# Patient Record
Sex: MALE | Race: WHITE
[De-identification: names, ages, dates, MRNs, and addresses within clinical notes are randomized per-mention and may not be internally consistent; named-entity substitution may affect disease eponyms.]

---

## 2019-01-29 ENCOUNTER — HOSPITAL ENCOUNTER (OUTPATIENT)
Dept: HOSPITAL 53 - M OROP | Age: 24
End: 2019-01-29
Attending: SURGERY
Payer: COMMERCIAL

## 2019-01-29 VITALS — BODY MASS INDEX: 30.81 KG/M2 | WEIGHT: 208 LBS | HEIGHT: 69 IN

## 2019-01-29 VITALS — DIASTOLIC BLOOD PRESSURE: 65 MMHG | SYSTOLIC BLOOD PRESSURE: 127 MMHG

## 2019-01-29 DIAGNOSIS — I73.9: Primary | ICD-10-CM

## 2019-01-29 DIAGNOSIS — Z53.8: ICD-10-CM

## 2019-02-07 ENCOUNTER — HOSPITAL ENCOUNTER (INPATIENT)
Dept: HOSPITAL 53 - M SDC | Age: 24
LOS: 16 days | Discharge: HOME | DRG: 254 | End: 2019-02-23
Attending: SURGERY | Admitting: SURGERY
Payer: COMMERCIAL

## 2019-02-07 VITALS — SYSTOLIC BLOOD PRESSURE: 126 MMHG | DIASTOLIC BLOOD PRESSURE: 70 MMHG

## 2019-02-07 VITALS — DIASTOLIC BLOOD PRESSURE: 69 MMHG | SYSTOLIC BLOOD PRESSURE: 126 MMHG

## 2019-02-07 VITALS — HEIGHT: 69 IN | WEIGHT: 190.04 LBS | BODY MASS INDEX: 28.15 KG/M2

## 2019-02-07 VITALS — DIASTOLIC BLOOD PRESSURE: 68 MMHG | SYSTOLIC BLOOD PRESSURE: 124 MMHG

## 2019-02-07 VITALS — SYSTOLIC BLOOD PRESSURE: 130 MMHG | DIASTOLIC BLOOD PRESSURE: 71 MMHG

## 2019-02-07 VITALS — DIASTOLIC BLOOD PRESSURE: 70 MMHG | SYSTOLIC BLOOD PRESSURE: 122 MMHG

## 2019-02-07 VITALS — SYSTOLIC BLOOD PRESSURE: 127 MMHG | DIASTOLIC BLOOD PRESSURE: 66 MMHG

## 2019-02-07 DIAGNOSIS — I70.211: Primary | ICD-10-CM

## 2019-02-07 DIAGNOSIS — F17.200: ICD-10-CM

## 2019-02-07 DIAGNOSIS — Z79.899: ICD-10-CM

## 2019-02-07 LAB
BASOPHILS # BLD AUTO: 0.1 10^3/UL (ref 0–0.2)
BASOPHILS NFR BLD AUTO: 0.5 % (ref 0–1)
BUN SERPL-MCNC: 12 MG/DL (ref 7–18)
CALCIUM SERPL-MCNC: 8.9 MG/DL (ref 8.5–10.1)
CHLORIDE SERPL-SCNC: 105 MEQ/L (ref 98–107)
CO2 SERPL-SCNC: 24 MEQ/L (ref 21–32)
CREAT SERPL-MCNC: 1.04 MG/DL (ref 0.7–1.3)
EOSINOPHIL # BLD AUTO: 0.4 10^3/UL (ref 0–0.5)
EOSINOPHIL NFR BLD AUTO: 3.8 % (ref 0–3)
GFR SERPL CREATININE-BSD FRML MDRD: > 60 ML/MIN/{1.73_M2} (ref 60–?)
GLUCOSE SERPL-MCNC: 76 MG/DL (ref 70–100)
HCT VFR BLD AUTO: 34.8 % (ref 42–52)
HGB BLD-MCNC: 11.9 G/DL (ref 13.5–17.5)
LYMPHOCYTES # BLD AUTO: 2.1 10^3/UL (ref 1.5–6.5)
LYMPHOCYTES NFR BLD AUTO: 22.5 % (ref 24–44)
MCH RBC QN AUTO: 31.1 PG (ref 27–33)
MCHC RBC AUTO-ENTMCNC: 34.2 G/DL (ref 32–36.5)
MCV RBC AUTO: 90.9 FL (ref 80–96)
MONOCYTES # BLD AUTO: 0.9 10^3/UL (ref 0–0.8)
MONOCYTES NFR BLD AUTO: 9 % (ref 0–5)
NEUTROPHILS # BLD AUTO: 6 10^3/UL (ref 1.8–7.7)
NEUTROPHILS NFR BLD AUTO: 63.8 % (ref 36–66)
PLATELET # BLD AUTO: 490 10^3/UL (ref 150–450)
POTASSIUM SERPL-SCNC: 4.3 MEQ/L (ref 3.5–5.1)
RBC # BLD AUTO: 3.83 10^6/UL (ref 4.3–6.1)
SODIUM SERPL-SCNC: 138 MEQ/L (ref 136–145)
WBC # BLD AUTO: 9.5 10^3/UL (ref 4–10)

## 2019-02-07 PROCEDURE — B41F1ZZ FLUOROSCOPY OF RIGHT LOWER EXTREMITY ARTERIES USING LOW OSMOLAR CONTRAST: ICD-10-PCS | Performed by: SURGERY

## 2019-02-07 RX ADMIN — DOCUSATE SODIUM,SENNOSIDES SCH TAB: 50; 8.6 TABLET, FILM COATED ORAL at 21:11

## 2019-02-07 RX ADMIN — DOCUSATE SODIUM SCH MG: 100 CAPSULE, LIQUID FILLED ORAL at 21:11

## 2019-02-07 NOTE — HPEPDOC
General


Date of Admission


02/07/2019


Attending Physician:  Marcus Howard MD


Chief Complaint


The patient is a 23-year-old male with right lower extremity claudication and 

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


Source:  Patient


Exam Limitations:  No limitations


Timing/Duration:  Constant, Getting worse


Severity:  Moderate





History of Present Illness


Patient is a 23-year-old male who injured his right leg approximately a year and

a half ago and underwent evaluation at [facility] for that was 

demonstrated that he had superficial femoral arterial occlusion on an MRA.  

Recommendation was for the patient at that time to continue with conservative 

management as his only symptoms were claudication in his right lower extremity. 

Patient has had worsening of his symptoms with claudication and less distance 

and is unable to perform his daily activities in the .  Patient 

underwent angiography with an attempt to recanalize through the occluded SFA and

popliteal artery without success.  Patient then underwent exposure of the above 

and below knee popliteal artery with attempted recanalization and endovascular 

intervention without success.  A third attempt at revascularization endovascular

was performed with posterior tibial artery cannulation and attempted retrograde 

recanalization through the popliteal and superficial femoral arteries which was 

unsuccessful.  The patient will undergo a right femoral to anterior tibial 

artery bypass with saphenous vein graft.





Home Medications


Scheduled


Cilostazol (Cilostazol) 100 Mg Tab, 100 MG PO BID@0730,1730


Clopidogrel Bisulfate (Clopidogrel) 75 Mg Tab, 75 MG PO DAILY


Ferrous Gluconate (Ferrous Gluconate) 324 Mg Tab, 1 TAB PO DAILY for iron


Gabapentin (Gabapentin) 100 Mg Cap, 100 MG PO TID





Scheduled PRN


Oxycodone/Acetaminophen (Percocet 5MG/325MG Tablet) 1 Tab Tab, 2 TAB PO Q6HP PRN

for SEVERE PAIN (PS 8-10)





Allergies


Coded Allergies:  


     No Known Allergies (Unverified , 1/29/19)





Subjective


General


Date/Time Seen


The patient was seen on 2/7/19 at 16:09.





Subject


Chief Complaint/History


The patient is a 23-year-old male admitted with right lower extremity 

claudication and complete occlusion of his right superficial femoral and 

popliteal arteries.





Current Medications


Current Medications





Current Medications


Acetaminophen/ Hydrocodone Bitart (Norco, Anexsia 5/325) 1 tab Q4HP  PRN PO MOD

ERATE PAIN (PS 5-7);  Start 2/7/19 at 18:45


Bisacodyl (Dulcolax Suppository) 10 mg DAILYPRN  PRN [place] CONSTIPATION;  Start 

2/7/19 at 18:45


Docusate Sodium (Colace) 100 mg BID PO  Last administered on 2/7/19at 21:11;  

Start 2/7/19 at 21:00


Heparin Sodium (Porcine) (Heparin)  ASDIRECTED  PRN IV SEE LABEL COMMENTS;  

Start 2/7/19 at 19:00


Heparin Sodium (Porcine) 19693 units/IV Miscellaneous Supplies 250 ml @ 0 mls/hr

Q0M IV  Last administered on 2/7/19at 21:07;  Start 2/7/19 at 18:51


Magnesium Hydroxide (Milk Of Magnesia) 30 ml DAILYPRN  PRN PO CONSTIPATION;  

Start 2/7/19 at 18:45


Non-Formulary Medication (Heparin Iv Rate Change Documentation ml/ Hr)  

ASDIRECTED XX ;  Start 2/7/19 at 19:00;  Stop 2/12/19 at 18:59


Senna/Docusate Sodium (Senokot S) 1 tab BID PO  Last administered on 2/7/19at 

21:11;  Start 2/7/19 at 21:00





Past Medical History


Medical History


Right lower extremity claudication with complete occlusion of his right 

superficial femoral and popliteal arteries.


Surgical History


Right lower extremity angiogram.


Right above and below knee popliteal artery exposure with attempted 

endarterectomy and revascularization.


Right lower extremity angiogram with ultrasound guided right posterior tibial 

artery cannulation with attempted recanalization of the occluded right 

superficial femoral and popliteal arteries which was unsuccessful.





Family History


Significant Family History:  No pertinent family hx





Social History


* Smoker:  current smoker, cigarettes


Alcohol:  Denies


Drugs:  denies


Recent Travel/Sick Contacts:  Denies: Recent travel, Recent sick contacts


Psychosocial History:  No pertinent psych hx





Review of Systems


Review of Systems


General:  Denies: Chills, Night Sweats, Fatigue, Malaise, Normal Appetite


Constitutional:  Denies: Chills, Fever, Malaise, Night Sweats, Weakness, 

Fatigue, Weight Loss, Lethargy


Eyes:  Denies: Pain, Vision change, Conjunctivae inflammation, Eyelid 

inflammation, Redness


HEENT:  Denies: Head Aches, Ear Pain, Dysphagia, Sinus Congestion, Post Nasal 

Drip, Sore Throat, Epistaxis


Skin:  Denies: Rash, Lesions, Jaundice, Bruising, Itching, Dry, Breakdown, Nail 

Changes


Pulmonary:  Denies: Dyspnea, Cough, Pleuritic Chest Pain


Cardiovascular:  Denies: Chest Pain, Palpitations, Orthopnea, Paroxysmal Noc. 

Dyspnea, Edema, Lt Headedness


Gastrointestinal:  Denies: Nausea, Vomiting, Abdominal Pain, Diarrhea, 

Constipation, Melena, Hematochezia


Genitourinary:  Denies: Dysuria, Frequency, Incontinence, Hematuria, Retention


Hematologic:  Denies: Bruising, Bleeding Excessively, Petecchia, Purpura, 

Enlarged Lymph Nodes


ENDOCRINE:  Denies: Polydipsia, Polyphagia, Polyuria, Heat Intolerance, Cold 

Intolerance


Musculoskeletal:  Denies: Neck Pain, Back Pain, Shoulder Pain, Arm Pain, Hand 

Pain, Leg Pain, Foot Pain, Joint Pain, Muscle Pain, Spasms


Neurological:  Denies: Weakness, Numbness, Incoordination, Change in speech, 

Confusion, Seizures


Psych:  Reports: Mood Normal; 


   Denies: Anxiety, Depression, Memory Issues, Thoughts of Self Harm, Anger, 

Thoughts of Harming Other





VITAL SIGNS


VITAL SIGNS





Vital Signs








  Date Time  Temp Pulse Resp B/P (MAP) Pulse Ox O2 Delivery O2 Flow Rate FiO2


 


2/7/19 13:51 98.1 76 18 127/60 (82) 96 Room Air  





Laboratory Tests


2/7/19 19:22: Activated Partial Thromboplast Time 136.9*H


2/7/19 19:23: 


White Blood Count 9.5, Red Blood Count 3.83L, Hemoglobin 11.9L, Hematocrit 

34.8L, Mean Corpuscular Volume 90.9, Mean Corpuscular Hemoglobin 31.1, Mean 

Corpuscular Hemoglobin Concent 34.2, Red Cell Distribution Width 11.2L, Platelet

Count 490H, Neutrophils (%) (Auto) 63.8, Lymphocytes (%) (Auto) 22.5L, Monocytes

(%) (Auto) 9.0H, Eosinophils (%) (Auto) 3.8H, Basophils (%) (Auto) 0.5, Neutrop

hils # (Auto) 6.0, Lymphocytes # (Auto) 2.1, Monocytes # (Auto) 0.9H, 

Eosinophils # (Auto) 0.4, Basophils # (Auto) 0.1, Immature Granulocyte % (Auto) 

0.4, Nucleated Red Blood Cells % (auto) 0.0, Blood Urea Nitrogen 12, Creatinine 

1.04, Sodium Level 138, Potassium Level 4.3, Chloride Level 105, Carbon Dioxide 

Level 24, Calcium Level 8.9, Anion Gap 9, Glomerular Filtration Rate > 60.0, 

Fasting Glucose 76





Current Medications








 Medications


  (Trade)  Dose


 Ordered  Sig/Srinivasa


 Route


 PRN Reason  Start Time


 Stop Time Status Last Admin


Dose Admin


 


 Docusate Sodium


  (Colace)  100 mg  BID


 PO


   2/7/19 21:00


    2/7/19 21:11


100 MG


 


 Heparin Sodium


  (Porcine) 86066


 units/IV


 Miscellaneous


 Supplies  250 ml @ 0


 mls/hr  Q0M


 IV


   2/7/19 18:51


    2/7/19 21:07


14 MLS/HR


 


 Senna/Docusate


 Sodium


  (Senokot S)  1 tab  BID


 PO


   2/7/19 21:00


    2/7/19 21:11


1 TAB





Laboratory Tests


2/7/19 19:23








Red Blood Count 3.83 L, Mean Corpuscular Volume 90.9, Mean Corpuscular 

Hemoglobin 31.1, Mean Corpuscular Hemoglobin Concent 34.2, Red Cell Distribution

Width 11.2 L, Neutrophils (%) (Auto) 63.8, Lymphocytes (%) (Auto) 22.5 L, 

Monocytes (%) (Auto) 9.0 H, Eosinophils (%) (Auto) 3.8 H, Basophils (%) (Auto) 

0.5, Neutrophils # (Auto) 6.0, Lymphocytes # (Auto) 2.1, Monocytes # (Auto) 0.9 

H, Eosinophils # (Auto) 0.4, Basophils # (Auto) 0.1, Calcium Level 8.9





Objective


Physical Examination


General Exam:  Positive: Alert, Cooperative, Mild Distress


Eye Exam:  Positive: PERRLA, Conjunctiva & lids normal, EOMI


ENT Exam:  Positive: Atraumatic, Mucous membr. moist/pink, Pharynx Normal, 

Tongue Midline, Pharyngeal Edema, Nares Patent, Ext Auditory Canal Nml, Pinna 

Normal


Neck Exam:  Positive: Supple, +2 carotid pulse wo bruit; 


   Negative: JVD, thyromegaly, Lymphadenopathy


Chest Exam:  Positive: Clear to auscultation, Normal air movement; 


   Negative: Rales, Rhonchi, Wheezing, Diminished


Heart Exam:  Positive: Rate Normal; 


   Negative: Tachycardic, Bradycardic, Regular Rhythm, Irregular Rhythm, 

Gallops, Murmurs, Rubs


Telemetry:  Positive: No significant arrhythmia, Sinus


Abdomen Exam:  Positive: Normal bowel sounds, Soft; 


   Negative: BS Hyperactive, BS Hypoactive, Tenderness, Hepatospenomegaly, Mass,

Hernia


Male  Exam:  Positive: Normal Genital Exam


Extremity Exam:  Positive: Normal pulses (patient with nonpalpable popliteal 

dorsalis pedis and posterior tibial pulses in the right lower extremity.  The 

pulses in the right lower extremity are obtainable with continuous wave Doppler 

ultrasound and are monophasic.); 


   Negative: Clubbing, Cyanosis, Edema, Tenderness, Swelling


Skin Exam:  Positive: Nl turgor and temperature; 


   Negative: Rash, Breakdown, Lesion, Pruritus


Neuro Exam:  Positive: Normal Gait, Normal Speech, Strength at 5/5 X4 ext, 

Normal Tone, Sensation Intact, Cranial Nerves 3-12 NL, Reflexes 2+


Psych Exam:  Positive: Mental status NL, Mood NL, Memory Intact, Oriented x 3; 


   Negative: Anxiety





Assessment/Plan


Assessment


23-year-old female with complete occlusion of his right side superficial femoral

and popliteal artery with severe debilitating claudication in his right lower 

extremity.  Patient has undergone multiple endovascular and hybrid combined open

and endovascular attempts at revascularization without success.  Patient will 

now undergo a right femoral to anterior tibial artery bypass graft with 

saphenous vein graft.





Plan


Right femoral to anterior tibial artery bypass grafting with saphenous vein 

graft.











Marcus Howard MD               Feb 7, 2019 22:10

## 2019-02-08 VITALS — SYSTOLIC BLOOD PRESSURE: 125 MMHG | DIASTOLIC BLOOD PRESSURE: 63 MMHG

## 2019-02-08 VITALS — DIASTOLIC BLOOD PRESSURE: 66 MMHG | SYSTOLIC BLOOD PRESSURE: 130 MMHG

## 2019-02-08 VITALS — DIASTOLIC BLOOD PRESSURE: 60 MMHG | SYSTOLIC BLOOD PRESSURE: 128 MMHG

## 2019-02-08 LAB
HCT VFR BLD AUTO: 25.1 % (ref 42–52)
HGB BLD-MCNC: 8.9 G/DL (ref 13.5–17.5)
MCH RBC QN AUTO: 31.3 PG (ref 27–33)
MCHC RBC AUTO-ENTMCNC: 35.5 G/DL (ref 32–36.5)
MCV RBC AUTO: 88.4 FL (ref 80–96)
PLATELET # BLD AUTO: 402 10^3/UL (ref 150–450)
RBC # BLD AUTO: 2.84 10^6/UL (ref 4.3–6.1)
WBC # BLD AUTO: 11.1 10^3/UL (ref 4–10)

## 2019-02-08 PROCEDURE — 04JY0ZZ INSPECTION OF LOWER ARTERY, OPEN APPROACH: ICD-10-PCS | Performed by: SURGERY

## 2019-02-08 PROCEDURE — 06BP0ZZ EXCISION OF RIGHT SAPHENOUS VEIN, OPEN APPROACH: ICD-10-PCS | Performed by: SURGERY

## 2019-02-08 RX ADMIN — HYDROCODONE BITARTRATE AND ACETAMINOPHEN PRN TAB: 5; 325 TABLET ORAL at 01:35

## 2019-02-08 RX ADMIN — FENTANYL CITRATE PRN MCG: 50 INJECTION, SOLUTION INTRAMUSCULAR; INTRAVENOUS at 23:40

## 2019-02-08 RX ADMIN — FENTANYL CITRATE PRN MCG: 50 INJECTION, SOLUTION INTRAMUSCULAR; INTRAVENOUS at 23:35

## 2019-02-08 RX ADMIN — FENTANYL CITRATE PRN MCG: 50 INJECTION, SOLUTION INTRAMUSCULAR; INTRAVENOUS at 23:45

## 2019-02-08 RX ADMIN — FENTANYL CITRATE PRN MCG: 50 INJECTION, SOLUTION INTRAMUSCULAR; INTRAVENOUS at 23:50

## 2019-02-08 RX ADMIN — DOCUSATE SODIUM SCH MG: 100 CAPSULE, LIQUID FILLED ORAL at 12:06

## 2019-02-08 RX ADMIN — DOCUSATE SODIUM SCH MG: 100 CAPSULE, LIQUID FILLED ORAL at 21:00

## 2019-02-08 RX ADMIN — DOCUSATE SODIUM,SENNOSIDES SCH TAB: 50; 8.6 TABLET, FILM COATED ORAL at 12:06

## 2019-02-08 RX ADMIN — DOCUSATE SODIUM,SENNOSIDES SCH TAB: 50; 8.6 TABLET, FILM COATED ORAL at 21:00

## 2019-02-08 NOTE — ECGEPIP
Stationary ECG Study

                              [facility]

                                       

                                       Test Date:    2019

Pat Name:     AMANDA DALEY              Department:   

Patient ID:   [id number]                 Room:         -

Gender:       M                        Technician:   

:          [id number]               Requested By: CHINA GORE

Order Number: [id number]     Reading MD:   Kika Farr

                                 Measurements

Intervals                              Axis          

Rate:         71                       P:            67

IA:           132                      QRS:          61

QRSD:         86                       T:            42

QT:           358                                    

QTc:          389                                    

                           Interpretive Statements

SINUS RHYTHM WITH SINUS ARRHYTHMIA

NO PRIOR 

Electronically Signed On 2019 8:39:13 EST by Kika Farr

## 2019-02-09 VITALS — DIASTOLIC BLOOD PRESSURE: 65 MMHG | SYSTOLIC BLOOD PRESSURE: 143 MMHG

## 2019-02-09 VITALS — DIASTOLIC BLOOD PRESSURE: 67 MMHG | SYSTOLIC BLOOD PRESSURE: 129 MMHG

## 2019-02-09 VITALS — DIASTOLIC BLOOD PRESSURE: 70 MMHG | SYSTOLIC BLOOD PRESSURE: 140 MMHG

## 2019-02-09 VITALS — SYSTOLIC BLOOD PRESSURE: 142 MMHG | DIASTOLIC BLOOD PRESSURE: 72 MMHG

## 2019-02-09 VITALS — DIASTOLIC BLOOD PRESSURE: 74 MMHG | SYSTOLIC BLOOD PRESSURE: 139 MMHG

## 2019-02-09 VITALS — SYSTOLIC BLOOD PRESSURE: 153 MMHG | DIASTOLIC BLOOD PRESSURE: 80 MMHG

## 2019-02-09 VITALS — SYSTOLIC BLOOD PRESSURE: 140 MMHG | DIASTOLIC BLOOD PRESSURE: 69 MMHG

## 2019-02-09 VITALS — DIASTOLIC BLOOD PRESSURE: 74 MMHG | SYSTOLIC BLOOD PRESSURE: 140 MMHG

## 2019-02-09 VITALS — SYSTOLIC BLOOD PRESSURE: 141 MMHG | DIASTOLIC BLOOD PRESSURE: 63 MMHG

## 2019-02-09 LAB
APTT BLD: 31.2 SECONDS (ref 25.4–37.6)
BASOPHILS # BLD AUTO: 0 10^3/UL (ref 0–0.2)
BASOPHILS NFR BLD AUTO: 0.2 % (ref 0–1)
EOSINOPHIL # BLD AUTO: 0.1 10^3/UL (ref 0–0.5)
EOSINOPHIL NFR BLD AUTO: 0.6 % (ref 0–3)
ERYTHROCYTE [SEDIMENTATION RATE] IN BLOOD BY WESTERGREN METHOD: 46 MM/HR (ref 0–15)
HCT VFR BLD AUTO: 27.6 % (ref 42–52)
HGB BLD-MCNC: 9.7 G/DL (ref 13.5–17.5)
INR PPP: 1.18
LYMPHOCYTES # BLD AUTO: 2.1 10^3/UL (ref 1.5–6.5)
LYMPHOCYTES NFR BLD AUTO: 19.7 % (ref 24–44)
MCH RBC QN AUTO: 30.9 PG (ref 27–33)
MCHC RBC AUTO-ENTMCNC: 35.1 G/DL (ref 32–36.5)
MCV RBC AUTO: 87.9 FL (ref 80–96)
MONOCYTES # BLD AUTO: 1 10^3/UL (ref 0–0.8)
MONOCYTES NFR BLD AUTO: 9.9 % (ref 0–5)
NEUTROPHILS # BLD AUTO: 7.3 10^3/UL (ref 1.8–7.7)
NEUTROPHILS NFR BLD AUTO: 69 % (ref 36–66)
PLATELET # BLD AUTO: 226 10^3/UL (ref 150–450)
PROTHROMBIN TIME: 15.2 SECONDS (ref 12.1–14.4)
RBC # BLD AUTO: 3.14 10^6/UL (ref 4.3–6.1)
WBC # BLD AUTO: 10.5 10^3/UL (ref 4–10)

## 2019-02-09 RX ADMIN — MORPHINE SULFATE PRN MG: 4 INJECTION INTRAVENOUS at 02:04

## 2019-02-09 RX ADMIN — DOCUSATE SODIUM,SENNOSIDES SCH TAB: 50; 8.6 TABLET, FILM COATED ORAL at 07:53

## 2019-02-09 RX ADMIN — HYDROCODONE BITARTRATE AND ACETAMINOPHEN PRN TAB: 5; 325 TABLET ORAL at 09:23

## 2019-02-09 RX ADMIN — MORPHINE SULFATE PRN MG: 4 INJECTION INTRAVENOUS at 17:25

## 2019-02-09 RX ADMIN — HYDROCODONE BITARTRATE AND ACETAMINOPHEN PRN TAB: 5; 325 TABLET ORAL at 13:38

## 2019-02-09 RX ADMIN — MORPHINE SULFATE PRN MG: 4 INJECTION INTRAVENOUS at 07:49

## 2019-02-09 RX ADMIN — MORPHINE SULFATE PRN MG: 4 INJECTION INTRAVENOUS at 19:40

## 2019-02-09 RX ADMIN — DOCUSATE SODIUM,SENNOSIDES SCH TAB: 50; 8.6 TABLET, FILM COATED ORAL at 19:40

## 2019-02-09 RX ADMIN — HYDROCODONE BITARTRATE AND ACETAMINOPHEN PRN TAB: 5; 325 TABLET ORAL at 18:01

## 2019-02-09 RX ADMIN — MORPHINE SULFATE PRN MG: 4 INJECTION INTRAVENOUS at 14:03

## 2019-02-09 RX ADMIN — DOCUSATE SODIUM SCH MG: 100 CAPSULE, LIQUID FILLED ORAL at 07:52

## 2019-02-09 RX ADMIN — MORPHINE SULFATE PRN MG: 4 INJECTION INTRAVENOUS at 16:05

## 2019-02-09 RX ADMIN — MORPHINE SULFATE PRN MG: 4 INJECTION INTRAVENOUS at 01:23

## 2019-02-09 RX ADMIN — DOCUSATE SODIUM SCH MG: 100 CAPSULE, LIQUID FILLED ORAL at 19:40

## 2019-02-09 RX ADMIN — MORPHINE SULFATE PRN MG: 4 INJECTION INTRAVENOUS at 23:24

## 2019-02-09 RX ADMIN — HYDROCODONE BITARTRATE AND ACETAMINOPHEN PRN TAB: 5; 325 TABLET ORAL at 22:11

## 2019-02-09 RX ADMIN — HYDROCODONE BITARTRATE AND ACETAMINOPHEN PRN TAB: 5; 325 TABLET ORAL at 04:33

## 2019-02-09 RX ADMIN — MORPHINE SULFATE PRN MG: 4 INJECTION INTRAVENOUS at 20:32

## 2019-02-09 NOTE — IPNPDOC
Subjective


General


Date/Time Seen


The patient was seen on 2/9/19 at 22:03.





Subject


Chief Complaint/History


The patient is a 23-year-old male admitted with a reason for visit of 

Claudication. 


Patient has significant pain in his right lower extremity.





Current Medications


Current Medications





Current Medications


Acetaminophen/ Hydrocodone Bitart (Norco, Anexsia 5/325) 1 tab Q4HP  PRN PO 

MODERATE PAIN (PS 5-7) Last administered on 2/9/19at 18:01;  Start 2/7/19 at 

18:45;  Stop 2/9/19 at 20:44;  Status DC


Acetaminophen/ Hydrocodone Bitart (Norco, Anexsia 5/325) 2 tab Q4HP  PRN PO 

SEVERE PAIN (PS 8-10);  Start 2/9/19 at 20:45


Bisacodyl (Dulcolax Suppository) 10 mg DAILYPRN  PRN [place] CONSTIPATION;  Start 

2/7/19 at 18:45


Docusate Sodium (Colace) 100 mg BID PO  Last administered on 2/9/19at 19:40;  

Start 2/7/19 at 21:00


Fentanyl Citrate (Sublimaze) 25 mcg Q5MP  PRN IV MODERATE PAIN (PS 4-7) Last 

administered on 2/8/19at 23:50;  Start 2/8/19 at 23:30;  Stop 2/9/19 at 00:20;  

Status DC


Heparin Sodium (Porcine) (Heparin)  ASDIRECTED  PRN IV SEE LABEL COMMENTS;  

Start 2/7/19 at 19:00;  Stop 2/8/19 at 23:52;  Status DC


Heparin Sodium (Porcine) 84439 units/IV Miscellaneous Supplies 250 ml @ 0 mls/hr

Q0M IV  Last administered on 2/7/19at 21:07;  Start 2/7/19 at 18:51;  Stop 

2/8/19 at 23:34;  Status DC


Hydromorphone HCl (Dilaudid) 0.5 mg Q15MP  PRN IV MODERATE/SEVERE PAIN (PS 5-10)

Last administered on 2/8/19at 23:58;  Start 2/8/19 at 23:45;  Stop 2/9/19 at 

01:15;  Status DC


Lactated Ringer's 1,000 ml @  100 mls/hr Q10H IV ;  Start 2/8/19 at 23:30;  Stop

2/9/19 at 01:00;  Status DC


Magnesium Hydroxide (Milk Of Magnesia) 30 ml DAILYPRN  PRN PO CONSTIPATION;  

Start 2/7/19 at 18:45


Metoclopramide HCl (REGLAN INJection) 10 mg Q6HP  PRN IV IF N/V OERSISTS IN 

RECOVERY Last administered on 2/9/19at 00:33;  Start 2/9/19 at 00:30;  Stop 

2/9/19 at 01:30;  Status DC


Morphine Sulfate (Morphine Sulfate Inj) 2 mg Q30MP  PRN IV SEVERE PAIN (PS 8-10)

Last administered on 2/9/19at 20:32;  Start 2/9/19 at 01:15


Non-Formulary Medication (Heparin Iv Rate Change Documentation ml/ Hr)  

ASDIRECTED XX ;  Start 2/7/19 at 19:00;  Stop 2/8/19 at 23:51;  Status DC


Ondansetron HCl (ZOFRAN INJection) 4 mg Q6HP  PRN IV NAUSEA OR VOMITING Last 

administered on 2/9/19at 01:23;  Start 2/9/19 at 01:15


Oxycodone/ Acetaminophen (Percocet 5mg/ 325mg Tablet) 1 tab ASDIRECTED  PRN PO 

MILD/MODERATE PAIN (PS 1-7) Last administered on 2/9/19at 00:09;  Start 2/8/19 

at 23:30;  Stop 2/9/19 at 01:00;  Status DC


Senna/Docusate Sodium (Senokot S) 1 tab BID PO  Last administered on 2/9/19at 

19:40;  Start 2/7/19 at 21:00





Allergies


Coded Allergies:  


     No Known Allergies (Unverified , 1/29/19)





VITAL SIGNS


VITAL SIGNS





Vital Signs








  Date Time  Temp Pulse Resp B/P (MAP) Pulse Ox O2 Delivery O2 Flow Rate FiO2


 


2/9/19 20:45   18     


 


2/9/19 20:32   16     


 


2/9/19 19:40   16     


 


2/9/19 18:31   16     


 


2/9/19 18:01   18     


 


2/9/19 17:25   16     


 


2/9/19 16:05   18     


 


2/9/19 14:03   16     


 


2/9/19 14:00 98.8 77 15 140/70 (93) 99   


 


2/9/19 13:38   16     


 


2/9/19 10:00 96.0 86 12 129/67 (87) 100   


 


2/9/19 09:23   16     


 


2/9/19 07:49   16     


 


2/9/19 06:00 98.7 66 12 139/74 (95) 98   


 


2/9/19 04:33   16     


 


2/9/19 04:00 98.1 74 13 142/72 (95) 97   


 


2/9/19 03:00 97.9 72 12 140/69 (92) 96   


 


2/9/19 02:04   18     


 


2/9/19 02:00 98.5 73 14 153/80 (104) 100   


 


2/9/19 01:30 98.1 78 16 143/65 (91) 98   


 


2/9/19 01:23   18     


 


2/9/19 01:00 97.7 86 14 140/74 (96) 97   


 


2/9/19 00:46  79   97   


 


2/9/19 00:45  79 16 131/68 (89) 97 Room Air  


 


2/9/19 00:41  93   98   


 


2/9/19 00:36  84   97   


 


2/9/19 00:31  79   97   


 


2/9/19 00:30 98.7 92 15 108/58 (75) 100 Nasal Cannula 2 


 


2/9/19 00:10  92  108/58 (75) 100   


 


2/9/19 00:09 98.5 92 14 108/58 100  2.0 


 


2/9/19 00:06    130/60 (83)    


 


2/9/19 00:05  88   100   


 


2/9/19 00:00  90 14 178/75 (109) 100 Nasal Cannula 2 


 


2/8/19 23:58    185/72 (109)    


 


2/8/19 23:58 98.5 92 14 108/58 100  2.0 


 


2/8/19 23:55  94  220/97 (138) 100   


 


2/8/19 23:50 98.5 92 14 108/58 100  2.0 


 


2/8/19 23:50  94  188/86 (120) 100   


 


2/8/19 23:45 98.5 92 15 108/58 100  2.0 


 


2/8/19 23:45  99 17 178/92 (120) 100 Nasal Cannula 2 


 


2/8/19 23:40 98.5 92 14 108/58 100  2.0 


 


2/8/19 23:40  100  180/81 (114) 100   


 


2/8/19 23:35   16  100   


 


2/8/19 23:35  103  177/87 (117) 100   


 


2/8/19 23:30 98.5 109 16 181/84 (116) 100 Nasal Cannula 2 














Intake & Output 


 


 2/9/19





 06:00


 


Intake Total 7305 ml


 


Output Total 3950 ml


 


Balance 3355 ml





Laboratory Tests


2/8/19 22:23: 


White Blood Count 11.1H, Red Blood Count 2.84L, Hemoglobin 8.9#L, Hematocrit 

25.1L, Mean Corpuscular Volume 88.4, Mean Corpuscular Hemoglobin 31.3, Mean 

Corpuscular Hemoglobin Concent 35.5, Red Cell Distribution Width 11.0L, Platelet

Count 402, Nucleated Red Blood Cells % (auto) 0.0


2/9/19 18:15: 


Prothrombin Time 15.2H, Prothromb Time International Ratio 1.18, Activated 

Partial Thromboplast Time 31.2, Lupus Anticoag DRVVT Screen Ratio [Pending]


2/9/19 18:18: 


White Blood Count 10.5H, Red Blood Count 3.14L, Hemoglobin 9.7L, Hematocrit 

27.6L, Mean Corpuscular Volume 87.9, Mean Corpuscular Hemoglobin 30.9, Mean 

Corpuscular Hemoglobin Concent 35.1, Red Cell Distribution Width 12.1, Platelet 

Count 226#, Nucleated Red Blood Cells % (auto) 0.0, Neutrophils (%) (Auto) 

69.0H, Lymphocytes (%) (Auto) 19.7L, Monocytes (%) (Auto) 9.9H, Eosinophils (%) 

(Auto) 0.6, Basophils (%) (Auto) 0.2, Neutrophils # (Auto) 7.3, Lymphocytes # 

(Auto) 2.1, Monocytes # (Auto) 1.0H, Eosinophils # (Auto) 0.1, Basophils # 

(Auto) 0.0, Immature Granulocyte % (Auto) 0.6, Erythrocyte Sedimentation Rate 

46H, Protein C Antigen [Pending], Protein S Antigen [Pending], Free Protein S 

Antigen [Pending], Anti-Thrombin III Antigen [Pending], Anti-Thrombin III 

Activity [Pending], Coagulation Factor V Leiden [Pending], Factor II 

(Prothrombin) Mutation [Pending], C-Reactive Protein, Quantitative 4.86H, 

Homocysteine [Pending], Serum Cryoglobulins [Pending], Anti-Nuclear Antibody 

Screen [Pending], Atypical ANCA [Pending], Cytoplasmic ANCA (c-ANCA) Antibody 

[Pending], Perinuclear ANCA (p-ANCA) Antibody [Pending], EJ Antibody [Pending], 

SS-A/Ro Antibody [Pending], SS-B/La Antibody [Pending], Anti-Glomerular Basement

 Memb Ab [Pending], Anti-Cardiolipin IgG Antibody [Pending], Anti-Cardiolipin 

IgA Antibody [Pending], Anti-Cardiolipin IgM Antibody [Pending]





Current Medications








 Medications


  (Trade)  Dose


 Ordered  Sig/Srinivasa


 Route


 PRN Reason  Start Time


 Stop Time Status Last Admin


Dose Admin


 


 Docusate Sodium


  (Colace)  100 mg  BID


 PO


   2/7/19 21:00


    2/9/19 19:40





 


 Morphine Sulfate


  (Morphine


 Sulfate Inj)  2 mg  Q30MP  PRN


 IV


 SEVERE PAIN (PS 8-10)  2/9/19 01:15


    2/9/19 20:32





 


 Ondansetron HCl


  (ZOFRAN


 INJection)  4 mg  Q6HP  PRN


 IV


 NAUSEA OR VOMITING  2/9/19 01:15


    2/9/19 01:23





 


 Senna/Docusate


 Sodium


  (Senokot S)  1 tab  BID


 PO


   2/7/19 21:00


    2/9/19 19:40








Laboratory Tests


2/8/19 22:23








Red Blood Count 2.84 L, Mean Corpuscular Volume 88.4, Mean Corpuscular 

Hemoglobin 31.3, Mean Corpuscular Hemoglobin Concent 35.5, Red Cell Distribution

 Width 11.0 L





2/9/19 18:18








Red Blood Count 3.14 L, Mean Corpuscular Volume 87.9, Mean Corpuscular 

Hemoglobin 30.9, Mean Corpuscular Hemoglobin Concent 35.1, Red Cell Distribution

 Width 12.1, Neutrophils (%) (Auto) 69.0 H, Lymphocytes (%) (Auto) 19.7 L, 

Monocytes (%) (Auto) 9.9 H, Eosinophils (%) (Auto) 0.6, Basophils (%) (Auto) 

0.2, Neutrophils # (Auto) 7.3, Lymphocytes # (Auto) 2.1, Monocytes # (Auto) 1.0 

H, Eosinophils # (Auto) 0.1, Basophils # (Auto) 0.0





Objective


Physical Examination


General Exam:  Positive: Alert, Cooperative, Mild Distress


Eye Exam:  Positive: PERRLA, Conjunctiva & lids normal, EOMI


ENT Exam:  Positive: Atraumatic, Mucous membr. moist/pink, Pharynx Normal, 

Tongue Midline, Pharyngeal Edema, Nares Patent, Ext Auditory Canal Nml, Pinna 

Normal


Neck Exam:  Positive: Supple, +2 carotid pulse wo bruit; 


   Negative: JVD, thyromegaly, Lymphadenopathy


Chest Exam:  Positive: Clear to auscultation, Normal air movement; 


   Negative: Rales, Rhonchi, Wheezing, Diminished


Heart Exam:  Positive: Rate Normal; 


   Negative: Tachycardic, Bradycardic, Regular Rhythm, Irregular Rhythm, 

Gallops, Murmurs, Rubs


Telemetry:  Positive: No significant arrhythmia, Sinus


Abdomen Exam:  Positive: Normal bowel sounds, Soft; 


   Negative: BS Hyperactive, BS Hypoactive, Tenderness, Hepatospenomegaly, Mass,

 Hernia


Male  Exam:  Positive: Normal Genital Exam


Extremity Exam:  Positive: Normal pulses (patient with nonpalpable popliteal 

dorsalis pedis and posterior tibial pulses in the right lower extremity.  The 

pulses in the right lower extremity are obtainable with continuous wave Doppler 

ultrasound and are monophasic.); 


   Negative: Clubbing, Cyanosis, Edema, Tenderness, Swelling


Skin Exam:  Positive: Nl turgor and temperature; 


   Negative: Rash, Breakdown, Lesion, Pruritus


Neuro Exam:  Positive: Normal Gait, Normal Speech, Strength at 5/5 X4 ext, 

Normal Tone, Sensation Intact, Cranial Nerves 3-12 NL, Reflexes 2+


Psych Exam:  Positive: Mental status NL, Mood NL, Memory Intact, Oriented x 3; 


   Negative: Anxiety





Assessment/Plan


Assessment


Patient is status post  attempted right lower extremity revascularization.  

Patient has significant pain in his right lower extremity from his incisions.





Plan


Patient will continue with pain control for his right lower extremity incisional

 pain.  Patient will begin working with physical therapy.











Marcus Howard MD               Feb 9, 2019 22:04

## 2019-02-10 VITALS — SYSTOLIC BLOOD PRESSURE: 160 MMHG | DIASTOLIC BLOOD PRESSURE: 80 MMHG

## 2019-02-10 VITALS — DIASTOLIC BLOOD PRESSURE: 62 MMHG | SYSTOLIC BLOOD PRESSURE: 137 MMHG

## 2019-02-10 VITALS — SYSTOLIC BLOOD PRESSURE: 130 MMHG | DIASTOLIC BLOOD PRESSURE: 57 MMHG

## 2019-02-10 VITALS — DIASTOLIC BLOOD PRESSURE: 75 MMHG | SYSTOLIC BLOOD PRESSURE: 148 MMHG

## 2019-02-10 VITALS — DIASTOLIC BLOOD PRESSURE: 81 MMHG | SYSTOLIC BLOOD PRESSURE: 143 MMHG

## 2019-02-10 RX ADMIN — MORPHINE SULFATE PRN MG: 4 INJECTION INTRAVENOUS at 01:13

## 2019-02-10 RX ADMIN — DOCUSATE SODIUM,SENNOSIDES SCH TAB: 50; 8.6 TABLET, FILM COATED ORAL at 08:48

## 2019-02-10 RX ADMIN — MORPHINE SULFATE PRN MG: 4 INJECTION INTRAVENOUS at 10:10

## 2019-02-10 RX ADMIN — DOCUSATE SODIUM,SENNOSIDES SCH TAB: 50; 8.6 TABLET, FILM COATED ORAL at 20:29

## 2019-02-10 RX ADMIN — Medication PRN MG: at 14:26

## 2019-02-10 RX ADMIN — MORPHINE SULFATE PRN MG: 4 INJECTION INTRAVENOUS at 08:48

## 2019-02-10 RX ADMIN — MORPHINE SULFATE PRN MG: 4 INJECTION INTRAVENOUS at 12:34

## 2019-02-10 RX ADMIN — SODIUM CHLORIDE SCH MLS/HR: 9 INJECTION, SOLUTION INTRAVENOUS at 13:29

## 2019-02-10 RX ADMIN — DOCUSATE SODIUM SCH MG: 100 CAPSULE, LIQUID FILLED ORAL at 20:29

## 2019-02-10 RX ADMIN — HYDROCODONE BITARTRATE AND ACETAMINOPHEN PRN TAB: 5; 325 TABLET ORAL at 02:18

## 2019-02-10 RX ADMIN — HYDROCODONE BITARTRATE AND ACETAMINOPHEN PRN TAB: 5; 325 TABLET ORAL at 06:28

## 2019-02-10 RX ADMIN — HYDROCODONE BITARTRATE AND ACETAMINOPHEN PRN TAB: 5; 325 TABLET ORAL at 12:06

## 2019-02-10 RX ADMIN — MORPHINE SULFATE PRN MG: 4 INJECTION INTRAVENOUS at 03:44

## 2019-02-10 RX ADMIN — MORPHINE SULFATE PRN MG: 4 INJECTION INTRAVENOUS at 05:01

## 2019-02-10 RX ADMIN — DOCUSATE SODIUM SCH MG: 100 CAPSULE, LIQUID FILLED ORAL at 08:48

## 2019-02-10 NOTE — IPNPDOC
Subjective


General


Date/Time Seen


The patient was seen on 2/10/19 at 13:38.





Subject


Chief Complaint/History


The patient is a 23-year-old male admitted with a reason for visit of 

Claudication.  Patient's pain is slightly improved.  Patient has difficulty 

ambulating due to pain when stepping on his right lower extremity.





Current Medications


Current Medications





Current Medications


Acetaminophen/ Hydrocodone Bitart (Norco, Anexsia 5/325) 1 tab Q4HP  PRN PO 

MODERATE PAIN (PS 5-7) Last administered on 2/9/19at 18:01;  Start 2/7/19 at 

18:45;  Stop 2/9/19 at 20:44;  Status DC


Acetaminophen/ Hydrocodone Bitart (Norco, Anexsia 5/325) 2 tab Q4HP  PRN PO 

SEVERE PAIN (PS 8-10) Last administered on 2/10/19at 12:06;  Start 2/9/19 at 

20:45;  Stop 2/10/19 at 13:33;  Status DC


Bisacodyl (Dulcolax Suppository) 10 mg DAILYPRN  PRN [place] CONSTIPATION;  Start 

2/7/19 at 18:45


Diphenhydramine HCl (Benadryl) 12.5 mg Q4HP  PRN IV ITCHING;  Start 2/10/19 at 

13:30;  Status UNV


Docusate Sodium (Colace) 100 mg BID PO  Last administered on 2/10/19at 08:48;  

Start 2/7/19 at 21:00


Fentanyl Citrate (Sublimaze) 25 mcg Q5MP  PRN IV MODERATE PAIN (PS 4-7) Last 

administered on 2/8/19at 23:50;  Start 2/8/19 at 23:30;  Stop 2/9/19 at 00:20;  

Status DC


Heparin Sodium (Porcine) (Heparin)  ASDIRECTED  PRN IV SEE LABEL COMMENTS;  

Start 2/7/19 at 19:00;  Stop 2/8/19 at 23:52;  Status DC


Heparin Sodium (Porcine) 21061 units/IV Miscellaneous Supplies 250 ml @ 0 mls/hr

Q0M IV  Last administered on 2/7/19at 21:07;  Start 2/7/19 at 18:51;  Stop 

2/8/19 at 23:34;  Status DC


Hydromorphone HCl (Dilaudid) 0.5 mg Q15MP  PRN IV MODERATE/SEVERE PAIN (PS 5-10)

Last administered on 2/8/19at 23:58;  Start 2/8/19 at 23:45;  Stop 2/9/19 at 

01:15;  Status DC


Lactated Ringer's 1,000 ml @  100 mls/hr Q10H IV ;  Start 2/8/19 at 23:30;  Stop

2/9/19 at 01:00;  Status DC


Magnesium Hydroxide (Milk Of Magnesia) 30 ml DAILYPRN  PRN PO CONSTIPATION;  

Start 2/7/19 at 18:45


Metoclopramide HCl (REGLAN INJection) 10 mg Q6HP  PRN IV IF N/V OERSISTS IN 

RECOVERY Last administered on 2/9/19at 00:33;  Start 2/9/19 at 00:30;  Stop 

2/9/19 at 01:30;  Status DC


Morphine Sulfate (Morphine Sulfate In 0.9%Nacl Iv Bag) 1 mg ASDIRECTED  PRN IV 

SEE LABEL COMMENTS;  Start 2/10/19 at 13:30;  Status UNV


Morphine Sulfate (Morphine Sulfate Inj) 2 mg Q30MP  PRN IV SEVERE PAIN (PS 8-10)

Last administered on 2/10/19at 12:34;  Start 2/9/19 at 01:15;  Stop 2/10/19 at 

13:33;  Status DC


Nalbuphine HCl (Nubain) 2.5 mg Q6HP  PRN IV PRURITIS;  Start 2/10/19 at 13:30;  

Stop 2/17/19 at 13:29;  Status UNV


Naloxone HCl (Narcan) 0.1 mg Q5MP  PRN IV SEE LABEL COMMENTS;  Start 2/10/19 at 

13:30;  Status UNV


Non-Formulary Medication (Epidural/PCA Keys) USE THIS ENTRY TO VEND ... Q1M  PRN

XX SEE LABEL COMMENTS;  Start 2/10/19 at 13:30;  Status UNV


Non-Formulary Medication (Heparin Iv Rate Change Documentation ml/ Hr)  

ASDIRECTED XX ;  Start 2/7/19 at 19:00;  Stop 2/8/19 at 23:51;  Status DC


Ondansetron HCl (ZOFRAN INJection) 4 mg Q6HP  PRN IV NAUSEA;  Start 2/10/19 at 

13:30;  Status UNV


Ondansetron HCl (ZOFRAN INJection) 4 mg Q6HP  PRN IV NAUSEA OR VOMITING Last 

administered on 2/9/19at 01:23;  Start 2/9/19 at 01:15


Oxycodone/ Acetaminophen (Percocet 5mg/ 325mg Tablet) 1 tab ASDIRECTED  PRN PO 

MILD/MODERATE PAIN (PS 1-7) Last administered on 2/9/19at 00:09;  Start 2/8/19 

at 23:30;  Stop 2/9/19 at 01:00;  Status DC


Senna/Docusate Sodium (Senokot S) 1 tab BID PO  Last administered on 2/10/19at 

08:48;  Start 2/7/19 at 21:00


Sodium Chloride 1,000 ml @  15 mls/hr Q24H IV ;  Start 2/10/19 at 13:29;  Status

UNV





Allergies


Coded Allergies:  


     No Known Allergies (Unverified , 1/29/19)





VITAL SIGNS


VITAL SIGNS





Vital Signs








  Date Time  Temp Pulse Resp B/P (MAP) Pulse Ox O2 Delivery O2 Flow Rate FiO2


 


2/10/19 12:36   14     


 


2/10/19 12:34   14     


 


2/10/19 12:06   16     


 


2/10/19 10:20   12     


 


2/10/19 10:10   13     


 


2/10/19 10:00 98.7 88 16 130/57 (81) 98   


 


2/10/19 08:48   12     


 


2/10/19 06:28   15     


 


2/10/19 06:00 98.5 90 15 137/62 (87) 98   


 


2/10/19 05:01   18     


 


2/10/19 03:44   16     


 


2/10/19 02:18   18     


 


2/10/19 01:13   18     


 


2/9/19 23:24   18     


 


2/9/19 22:11   15     


 


2/9/19 22:00 98.7 86 18 141/63 (89) 100   


 


2/9/19 20:32   16     


 


2/9/19 19:40   16     


 


2/9/19 18:31   16     


 


2/9/19 18:01   18     


 


2/9/19 17:25   16     


 


2/9/19 16:05   18     


 


2/9/19 14:03   16     


 


2/9/19 14:00 98.8 77 15 140/70 (93) 99   














Intake & Output 


 


 2/10/19





 06:00


 


Intake Total 1590 ml


 


Output Total 5200 ml


 


Balance -3610 ml





Laboratory Tests


2/9/19 18:15: 


Prothrombin Time 15.2H, Prothromb Time International Ratio 1.18, Activated 

Partial Thromboplast Time 31.2, Lupus Anticoag DRVVT Screen Ratio [Pending]


2/9/19 18:18: 


White Blood Count 10.5H, Red Blood Count 3.14L, Hemoglobin 9.7L, Hematocrit 

27.6L, Mean Corpuscular Volume 87.9, Mean Corpuscular Hemoglobin 30.9, Mean 

Corpuscular Hemoglobin Concent 35.1, Red Cell Distribution Width 12.1, Platelet 

Count 226#, Neutrophils (%) (Auto) 69.0H, Lymphocytes (%) (Auto) 19.7L, 

Monocytes (%) (Auto) 9.9H, Eosinophils (%) (Auto) 0.6, Basophils (%) (Auto) 0.2,

 Neutrophils # (Auto) 7.3, Lymphocytes # (Auto) 2.1, Monocytes # (Auto) 1.0H, 

Eosinophils # (Auto) 0.1, Basophils # (Auto) 0.0, Immature Granulocyte % (Auto) 

0.6, Nucleated Red Blood Cells % (auto) 0.0, Erythrocyte Sedimentation Rate 46H,

 Protein C Antigen [Pending], Protein S Antigen [Pending], Free Protein S 

Antigen [Pending], Anti-Thrombin III Antigen [Pending], Anti-Thrombin III 

Activity [Pending], Coagulation Factor V Leiden [Pending], Factor II 

(Prothrombin) Mutation [Pending], C-Reactive Protein, Quantitative 4.86H, 

Homocysteine [Pending], Serum Cryoglobulins [Pending], Anti-Nuclear Antibody 

Screen [Pending], Atypical ANCA [Pending], Cytoplasmic ANCA (c-ANCA) Antibody 

[Pending], Perinuclear ANCA (p-ANCA) Antibody [Pending], EJ Antibody [Pending], 

SS-A/Ro Antibody [Pending], SS-B/La Antibody [Pending], Anti-Glomerular Basement

 Memb Ab [Pending], Anti-Cardiolipin IgG Antibody [Pending], Anti-Cardiolipin 

IgA Antibody [Pending], Anti-Cardiolipin IgM Antibody [Pending]





Current Medications








 Medications


  (Trade)  Dose


 Ordered  Sig/Srinivasa


 Route


 PRN Reason  Start Time


 Stop Time Status Last Admin


Dose Admin


 


 Docusate Sodium


  (Colace)  100 mg  BID


 PO


   2/7/19 21:00


    2/10/19 08:48





 


 Ondansetron HCl


  (ZOFRAN


 INJection)  4 mg  Q6HP  PRN


 IV


 NAUSEA OR VOMITING  2/9/19 01:15


    2/9/19 01:23





 


 Senna/Docusate


 Sodium


  (Senokot S)  1 tab  BID


 PO


   2/7/19 21:00


    2/10/19 08:48








Laboratory Tests


2/8/19 22:23








Red Blood Count 2.84 L, Mean Corpuscular Volume 88.4, Mean Corpuscular 

Hemoglobin 31.3, Mean Corpuscular Hemoglobin Concent 35.5, Red Cell Distribution

Width 11.0 L





2/9/19 18:18








Red Blood Count 3.14 L, Mean Corpuscular Volume 87.9, Mean Corpuscular 

Hemoglobin 30.9, Mean Corpuscular Hemoglobin Concent 35.1, Red Cell Distribution

Width 12.1, Neutrophils (%) (Auto) 69.0 H, Lymphocytes (%) (Auto) 19.7 L, 

Monocytes (%) (Auto) 9.9 H, Eosinophils (%) (Auto) 0.6, Basophils (%) (Auto) 

0.2, Neutrophils # (Auto) 7.3, Lymphocytes # (Auto) 2.1, Monocytes # (Auto) 1.0 

H, Eosinophils # (Auto) 0.1, Basophils # (Auto) 0.0





Objective


Physical Examination


General Exam:  Positive: Alert, Cooperative, Mild Distress


Eye Exam:  Positive: PERRLA, Conjunctiva & lids normal, EOMI


ENT Exam:  Positive: Atraumatic, Mucous membr. moist/pink, Pharynx Normal, 

Tongue Midline, Pharyngeal Edema, Nares Patent, Ext Auditory Canal Nml, Pinna 

Normal


Neck Exam:  Positive: Supple, +2 carotid pulse wo bruit; 


   Negative: JVD, thyromegaly, Lymphadenopathy


Chest Exam:  Positive: Clear to auscultation, Normal air movement; 


   Negative: Rales, Rhonchi, Wheezing, Diminished


Heart Exam:  Positive: Rate Normal; 


   Negative: Tachycardic, Bradycardic, Regular Rhythm, Irregular Rhythm, 

Gallops, Murmurs, Rubs


Telemetry:  Positive: No significant arrhythmia, Sinus


Abdomen Exam:  Positive: Normal bowel sounds, Soft; 


   Negative: BS Hyperactive, BS Hypoactive, Tenderness, Hepatospenomegaly, Mass,

Hernia


Male  Exam:  Positive: Normal Genital Exam


Extremity Exam:  Positive: Normal pulses (patient with nonpalpable popliteal 

dorsalis pedis and posterior tibial pulses in the right lower extremity.  The 

pulses in the right lower extremity are obtainable with continuous wave Doppler 

ultrasound and are monophasic.); 


   Negative: Clubbing, Cyanosis, Edema, Tenderness, Swelling


Skin Exam:  Positive: Nl turgor and temperature; 


   Negative: Rash, Breakdown, Lesion, Pruritus


Neuro Exam:  Positive: Normal Gait, Normal Speech, Strength at 5/5 X4 ext, 

Normal Tone, Sensation Intact, Cranial Nerves 3-12 NL, Reflexes 2+


Psych Exam:  Positive: Mental status NL, Mood NL, Memory Intact, Oriented x 3; 


   Negative: Anxiety





Assessment/Plan


Assessment


Patient is status post attempted right lower extremity revascularization 

secondary to his occluded superficial femoral and popliteal arteries.  Patient 

has continued pain in his right lower extremity.  Patient's right lower 

extremity is well-perfused.





Plan


Patient will continue with aggressive pain management.  Patient was counseled on

the need to stop tobacco use as this is the primary cause of his right lower 

extremity disease.  Patient will continue with aggressive physical therapy.











Marcus Howard MD              Feb 10, 2019 13:40

## 2019-02-11 VITALS — DIASTOLIC BLOOD PRESSURE: 78 MMHG | SYSTOLIC BLOOD PRESSURE: 170 MMHG

## 2019-02-11 VITALS — SYSTOLIC BLOOD PRESSURE: 147 MMHG | DIASTOLIC BLOOD PRESSURE: 69 MMHG

## 2019-02-11 VITALS — SYSTOLIC BLOOD PRESSURE: 145 MMHG | DIASTOLIC BLOOD PRESSURE: 65 MMHG

## 2019-02-11 VITALS — DIASTOLIC BLOOD PRESSURE: 75 MMHG | SYSTOLIC BLOOD PRESSURE: 138 MMHG

## 2019-02-11 VITALS — DIASTOLIC BLOOD PRESSURE: 71 MMHG | SYSTOLIC BLOOD PRESSURE: 136 MMHG

## 2019-02-11 VITALS — DIASTOLIC BLOOD PRESSURE: 74 MMHG | SYSTOLIC BLOOD PRESSURE: 146 MMHG

## 2019-02-11 RX ADMIN — DOCUSATE SODIUM,SENNOSIDES SCH TAB: 50; 8.6 TABLET, FILM COATED ORAL at 20:10

## 2019-02-11 RX ADMIN — Medication PRN MG: at 13:54

## 2019-02-11 RX ADMIN — DOCUSATE SODIUM SCH MG: 100 CAPSULE, LIQUID FILLED ORAL at 20:10

## 2019-02-11 RX ADMIN — DOCUSATE SODIUM,SENNOSIDES SCH TAB: 50; 8.6 TABLET, FILM COATED ORAL at 08:15

## 2019-02-11 RX ADMIN — DOCUSATE SODIUM SCH MG: 100 CAPSULE, LIQUID FILLED ORAL at 08:15

## 2019-02-11 RX ADMIN — SODIUM CHLORIDE SCH MLS/HR: 9 INJECTION, SOLUTION INTRAVENOUS at 13:54

## 2019-02-11 NOTE — IPNPDOC
Subjective


General


Date/Time Seen


The patient was seen on 2/11/19 at 20:50.





Subject


Chief Complaint/History


The patient is a 23-year-old male admitted with a reason for visit of 

Claudication.  Patient's pain is slightly improved.  A shunt has no other 

complaints.





Current Medications


Current Medications





Current Medications


Acetaminophen/ Hydrocodone Bitart (Norco, Anexsia 5/325) 1 tab Q4HP  PRN PO 

MODERATE PAIN (PS 5-7) Last administered on 2/9/19at 18:01;  Start 2/7/19 at 

18:45;  Stop 2/9/19 at 20:44;  Status DC


Acetaminophen/ Hydrocodone Bitart (Norco, Anexsia 5/325) 2 tab Q4HP  PRN PO 

SEVERE PAIN (PS 8-10) Last administered on 2/10/19at 12:06;  Start 2/9/19 at 

20:45;  Stop 2/10/19 at 13:33;  Status DC


Bisacodyl (Dulcolax Suppository) 10 mg DAILYPRN  PRN CT CONSTIPATION;  Start 

2/7/19 at 18:45


Diphenhydramine HCl (Benadryl) 12.5 mg Q4HP  PRN IV ITCHING;  Start 2/10/19 at 1

3:30


Docusate Sodium (Colace) 100 mg BID PO  Last administered on 2/11/19at 20:10;  

Start 2/7/19 at 21:00


Fentanyl Citrate (Sublimaze) 25 mcg Q5MP  PRN IV MODERATE PAIN (PS 4-7) Last 

administered on 2/8/19at 23:50;  Start 2/8/19 at 23:30;  Stop 2/9/19 at 00:20;  

Status DC


Heparin Sodium (Porcine) (Heparin)  ASDIRECTED  PRN IV SEE LABEL COMMENTS;  

Start 2/7/19 at 19:00;  Stop 2/8/19 at 23:52;  Status DC


Heparin Sodium (Porcine) 23480 units/IV Miscellaneous Supplies 250 ml @ 0 mls/hr

Q0M IV  Last administered on 2/7/19at 21:07;  Start 2/7/19 at 18:51;  Stop 

2/8/19 at 23:34;  Status DC


Hydromorphone HCl (Dilaudid) 0.5 mg Q15MP  PRN IV MODERATE/SEVERE PAIN (PS 5-10)

Last administered on 2/8/19at 23:58;  Start 2/8/19 at 23:45;  Stop 2/9/19 at 

01:15;  Status DC


Lactated Ringer's 1,000 ml @  100 mls/hr Q10H IV ;  Start 2/8/19 at 23:30;  Stop

2/9/19 at 01:00;  Status DC


Magnesium Hydroxide (Milk Of Magnesia) 30 ml DAILYPRN  PRN PO CONSTIPATION;  

Start 2/7/19 at 18:45


Metoclopramide HCl (REGLAN INJection) 10 mg Q6HP  PRN IV IF N/V OERSISTS IN 

RECOVERY Last administered on 2/9/19at 00:33;  Start 2/9/19 at 00:30;  Stop 

2/9/19 at 01:30;  Status DC


Morphine Sulfate (Morphine Sulfate In 0.9%Nacl Iv Bag) 1 mg ASDIRECTED  PRN IV 

SEE LABEL COMMENTS Last administered on 2/11/19at 13:54;  Start 2/10/19 at 13:30


Morphine Sulfate (Morphine Sulfate Inj) 2 mg Q30MP  PRN IV SEVERE PAIN (PS 8-10)

Last administered on 2/10/19at 12:34;  Start 2/9/19 at 01:15;  Stop 2/10/19 at 

13:33;  Status DC


Nalbuphine HCl (Nubain) 2.5 mg Q6HP  PRN IV PRURITIS;  Start 2/10/19 at 13:30;  

Stop 2/17/19 at 13:29


Naloxone HCl (Narcan) 0.1 mg Q5MP  PRN IV SEE LABEL COMMENTS;  Start 2/10/19 at 

13:30


Non-Formulary Medication (Epidural/PCA Keys) USE THIS ENTRY TO VEND ... Q1M  PRN

XX SEE LABEL COMMENTS;  Start 2/10/19 at 13:30


Non-Formulary Medication (Heparin Iv Rate Change Documentation ml/ Hr)  

ASDIRECTED XX ;  Start 2/7/19 at 19:00;  Stop 2/8/19 at 23:51;  Status DC


Ondansetron HCl (ZOFRAN INJection) 4 mg Q6HP  PRN IV NAUSEA;  Start 2/10/19 at 

13:30


Ondansetron HCl (ZOFRAN INJection) 4 mg Q6HP  PRN IV NAUSEA OR VOMITING Last 

administered on 2/9/19at 01:23;  Start 2/9/19 at 01:15


Oxycodone/ Acetaminophen (Percocet 5mg/ 325mg Tablet) 1 tab ASDIRECTED  PRN PO 

MILD/MODERATE PAIN (PS 1-7) Last administered on 2/9/19at 00:09;  Start 2/8/19 

at 23:30;  Stop 2/9/19 at 01:00;  Status DC


Senna/Docusate Sodium (Senokot S) 1 tab BID PO  Last administered on 2/11/19at 

20:10;  Start 2/7/19 at 21:00


Sodium Chloride 1,000 ml @  15 mls/hr Q24H IV  Last administered on 2/11/19at 

13:54;  Start 2/10/19 at 13:29





Allergies


Coded Allergies:  


     No Known Allergies (Unverified , 1/29/19)





VITAL SIGNS


VITAL SIGNS





Vital Signs








  Date Time  Temp Pulse Resp B/P (MAP) Pulse Ox O2 Delivery O2 Flow Rate FiO2


 


2/11/19 18:00 99.8 87 16 136/71 (92) 99   


 


2/11/19 14:00 98.7 84 14 147/69 (95) 100   


 


2/11/19 10:00 99.4 97 14 170/78 (108) 98   


 


2/11/19 09:00   11     


 


2/11/19 06:00 99.2 106 13 145/65 (91) 98   


 


2/11/19 05:52   16     


 


2/11/19 02:00 99.7 105 16 138/75 (96) 98   


 


2/10/19 22:00 99.0 98 16 143/81 (101) 99   














Intake & Output 


 


 2/11/19





 06:00


 


Intake Total 2572.5 ml


 


Output Total 3100 ml


 


Balance -527.5 ml








Current Medications








 Medications


  (Trade)  Dose


 Ordered  Sig/Srinivasa


 Route


 PRN Reason  Start Time


 Stop Time Status Last Admin


Dose Admin


 


 Docusate Sodium


  (Colace)  100 mg  BID


 PO


   2/7/19 21:00


    2/11/19 20:10





 


 Morphine Sulfate


  (Morphine


 Sulfate In


 0.9%Nacl Iv Bag)  1 mg  ASDIRECTED  PRN


 IV


 SEE LABEL COMMENTS  2/10/19 13:30


    2/11/19 13:54





 


 Ondansetron HCl


  (ZOFRAN


 INJection)  4 mg  Q6HP  PRN


 IV


 NAUSEA OR VOMITING  2/9/19 01:15


    2/9/19 01:23





 


 Senna/Docusate


 Sodium


  (Senokot S)  1 tab  BID


 PO


   2/7/19 21:00


    2/11/19 20:10





 


 Sodium Chloride  1,000 ml @ 


 15 mls/hr  Q24H


 IV


   2/10/19 13:29


    2/11/19 13:54














Objective


Physical Examination


General Exam:  Positive: Alert, Cooperative, Mild Distress


Eye Exam:  Positive: PERRLA, Conjunctiva & lids normal, EOMI


ENT Exam:  Positive: Atraumatic, Mucous membr. moist/pink, Pharynx Normal, 

Tongue Midline, Pharyngeal Edema, Nares Patent, Ext Auditory Canal Nml, Pinna 

Normal


Neck Exam:  Positive: Supple, +2 carotid pulse wo bruit; 


   Negative: JVD, thyromegaly, Lymphadenopathy


Chest Exam:  Positive: Clear to auscultation, Normal air movement; 


   Negative: Rales, Rhonchi, Wheezing, Diminished


Heart Exam:  Positive: Rate Normal; 


   Negative: Tachycardic, Bradycardic, Regular Rhythm, Irregular Rhythm, 

Gallops, Murmurs, Rubs


Telemetry:  Positive: No significant arrhythmia, Sinus


Abdomen Exam:  Positive: Normal bowel sounds, Soft; 


   Negative: BS Hyperactive, BS Hypoactive, Tenderness, Hepatospenomegaly, Mass,

Hernia


Male  Exam:  Positive: Normal Genital Exam


Extremity Exam:  Positive: Normal pulses (patient with nonpalpable popliteal 

dorsalis pedis and posterior tibial pulses in the right lower extremity.  The 

pulses in the right lower extremity are obtainable with continuous wave Doppler 

ultrasound and are monophasic.); 


   Negative: Clubbing, Cyanosis, Edema, Tenderness, Swelling


Skin Exam:  Positive: Nl turgor and temperature; 


   Negative: Rash, Breakdown, Lesion, Pruritus


Neuro Exam:  Positive: Normal Gait, Normal Speech, Strength at 5/5 X4 ext, 

Normal Tone, Sensation Intact, Cranial Nerves 3-12 NL, Reflexes 2+


Psych Exam:  Positive: Mental status NL, Mood NL, Memory Intact, Oriented x 3; 


   Negative: Anxiety





Assessment/Plan


Assessment


Patient has continued pain in his incisions postoperatively.  Patient has been 

slow to be able to ambulate due to his pain.





Plan


Patient will continue with aggressive pain management.  Patient will continue to

work with physical therapy and once cleared by physical therapy will be 

discharged home.











Marcus Howard MD              Feb 11, 2019 20:52

## 2019-02-12 VITALS — DIASTOLIC BLOOD PRESSURE: 76 MMHG | SYSTOLIC BLOOD PRESSURE: 145 MMHG

## 2019-02-12 VITALS — SYSTOLIC BLOOD PRESSURE: 134 MMHG | DIASTOLIC BLOOD PRESSURE: 66 MMHG

## 2019-02-12 VITALS — DIASTOLIC BLOOD PRESSURE: 61 MMHG | SYSTOLIC BLOOD PRESSURE: 130 MMHG

## 2019-02-12 VITALS — SYSTOLIC BLOOD PRESSURE: 132 MMHG | DIASTOLIC BLOOD PRESSURE: 62 MMHG

## 2019-02-12 VITALS — DIASTOLIC BLOOD PRESSURE: 62 MMHG | SYSTOLIC BLOOD PRESSURE: 138 MMHG

## 2019-02-12 VITALS — SYSTOLIC BLOOD PRESSURE: 120 MMHG | DIASTOLIC BLOOD PRESSURE: 66 MMHG

## 2019-02-12 VITALS — SYSTOLIC BLOOD PRESSURE: 134 MMHG | DIASTOLIC BLOOD PRESSURE: 74 MMHG

## 2019-02-12 RX ADMIN — DOCUSATE SODIUM,SENNOSIDES SCH TAB: 50; 8.6 TABLET, FILM COATED ORAL at 09:37

## 2019-02-12 RX ADMIN — DOCUSATE SODIUM SCH MG: 100 CAPSULE, LIQUID FILLED ORAL at 09:37

## 2019-02-12 RX ADMIN — DOCUSATE SODIUM SCH MG: 100 CAPSULE, LIQUID FILLED ORAL at 20:59

## 2019-02-12 RX ADMIN — DOCUSATE SODIUM,SENNOSIDES SCH TAB: 50; 8.6 TABLET, FILM COATED ORAL at 20:59

## 2019-02-12 RX ADMIN — Medication PRN MG: at 14:00

## 2019-02-12 RX ADMIN — SODIUM CHLORIDE SCH MLS/HR: 9 INJECTION, SOLUTION INTRAVENOUS at 13:29

## 2019-02-13 VITALS — SYSTOLIC BLOOD PRESSURE: 141 MMHG | DIASTOLIC BLOOD PRESSURE: 67 MMHG

## 2019-02-13 VITALS — SYSTOLIC BLOOD PRESSURE: 139 MMHG | DIASTOLIC BLOOD PRESSURE: 91 MMHG

## 2019-02-13 VITALS — SYSTOLIC BLOOD PRESSURE: 131 MMHG | DIASTOLIC BLOOD PRESSURE: 66 MMHG

## 2019-02-13 VITALS — DIASTOLIC BLOOD PRESSURE: 77 MMHG | SYSTOLIC BLOOD PRESSURE: 138 MMHG

## 2019-02-13 VITALS — SYSTOLIC BLOOD PRESSURE: 147 MMHG | DIASTOLIC BLOOD PRESSURE: 73 MMHG

## 2019-02-13 VITALS — DIASTOLIC BLOOD PRESSURE: 67 MMHG | SYSTOLIC BLOOD PRESSURE: 140 MMHG

## 2019-02-13 LAB
ANTI-GLOMERULAR BASEMENT MEMB: 3 UNITS (ref 0–20)
CARDIOLIPIN IGA SER IA-ACNC: <9 APL U/ML (ref 0–11)
CARDIOLIPIN IGG SER IA-ACNC: <9 GPL U/ML (ref 0–14)
CARDIOLIPIN IGM SER IA-ACNC: <9 MPL U/ML (ref 0–12)
DRVVT CONFIRM PPP QL: 43.5 SEC
DRVVT SCREEN TO CONFIRM RATIO: 1.1 {RATIO} (ref 0–1.2)
PROT PATTERN SERPL IFE-IMP: 7.8 UMOL/L (ref 0–15)

## 2019-02-13 RX ADMIN — DOCUSATE SODIUM,SENNOSIDES SCH TAB: 50; 8.6 TABLET, FILM COATED ORAL at 20:52

## 2019-02-13 RX ADMIN — DOCUSATE SODIUM SCH MG: 100 CAPSULE, LIQUID FILLED ORAL at 20:52

## 2019-02-13 RX ADMIN — DOCUSATE SODIUM,SENNOSIDES SCH TAB: 50; 8.6 TABLET, FILM COATED ORAL at 08:26

## 2019-02-13 RX ADMIN — MAGNESIUM HYDROXIDE PRN ML: 400 SUSPENSION ORAL at 00:08

## 2019-02-13 RX ADMIN — Medication PRN MG: at 15:00

## 2019-02-13 RX ADMIN — MAGNESIUM HYDROXIDE PRN ML: 400 SUSPENSION ORAL at 08:26

## 2019-02-13 RX ADMIN — DOCUSATE SODIUM SCH MG: 100 CAPSULE, LIQUID FILLED ORAL at 08:26

## 2019-02-13 RX ADMIN — HYDROCODONE BITARTRATE AND ACETAMINOPHEN PRN TAB: 5; 325 TABLET ORAL at 20:51

## 2019-02-13 RX ADMIN — SODIUM CHLORIDE SCH MLS/HR: 9 INJECTION, SOLUTION INTRAVENOUS at 02:24

## 2019-02-13 RX ADMIN — HYDROCODONE BITARTRATE AND ACETAMINOPHEN PRN TAB: 5; 325 TABLET ORAL at 16:40

## 2019-02-14 VITALS — DIASTOLIC BLOOD PRESSURE: 68 MMHG | SYSTOLIC BLOOD PRESSURE: 132 MMHG

## 2019-02-14 VITALS — SYSTOLIC BLOOD PRESSURE: 162 MMHG | DIASTOLIC BLOOD PRESSURE: 77 MMHG

## 2019-02-14 VITALS — SYSTOLIC BLOOD PRESSURE: 148 MMHG | DIASTOLIC BLOOD PRESSURE: 76 MMHG

## 2019-02-14 LAB
AT III ACT/NOR PPP CHRO: 106 % (ref 75–135)
AT III AG PPP IA-MCNC: 79 % (ref 72–124)
PROT C AG ACT/NOR PPP IA: 85 % (ref 60–150)
PROT S AG ACT/NOR PPP IA: 69 % (ref 60–150)
PROT S FREE AG ACT/NOR PPP IA: 83 % (ref 57–157)

## 2019-02-14 RX ADMIN — DOCUSATE SODIUM,SENNOSIDES SCH TAB: 50; 8.6 TABLET, FILM COATED ORAL at 21:28

## 2019-02-14 RX ADMIN — DOCUSATE SODIUM,SENNOSIDES SCH TAB: 50; 8.6 TABLET, FILM COATED ORAL at 07:34

## 2019-02-14 RX ADMIN — HYDROCODONE BITARTRATE AND ACETAMINOPHEN PRN TAB: 5; 325 TABLET ORAL at 00:39

## 2019-02-14 RX ADMIN — DOCUSATE SODIUM SCH MG: 100 CAPSULE, LIQUID FILLED ORAL at 07:34

## 2019-02-14 RX ADMIN — DOCUSATE SODIUM SCH MG: 100 CAPSULE, LIQUID FILLED ORAL at 21:28

## 2019-02-14 NOTE — IPNPDOC
Subjective


General


Date/Time Seen


The patient was seen on 2/14/19 at 23:43.





Subject


Chief Complaint/History


The patient is a 23-year-old male admitted with a reason for visit of 

Claudication.  Patient has significant improvement in his pain but continues to 

have difficulty ambulating secondary to the pain.





Current Medications


Current Medications





Current Medications


Acetaminophen/ Hydrocodone Bitart (Norco, Anexsia 5/325) 1 tab Q4HP  PRN PO 

MODERATE PAIN (PS 5-7) Last administered on 2/9/19at 18:01;  Start 2/7/19 at 

18:45;  Stop 2/9/19 at 20:44;  Status DC


Acetaminophen/ Hydrocodone Bitart (Norco, Anexsia 5/325) 2 tab Q4HP  PRN PO 

SEVERE PAIN (PS 8-10) Last administered on 2/14/19at 00:39;  Start 2/13/19 at 

15:45;  Stop 2/14/19 at 01:06;  Status DC


Acetaminophen/ Hydrocodone Bitart (Norco, Anexsia 5/325) 2 tab Q4HP  PRN PO 

SEVERE PAIN (PS 8-10) Last administered on 2/10/19at 12:06;  Start 2/9/19 at 

20:45;  Stop 2/10/19 at 13:33;  Status DC


Bisacodyl (Dulcolax Suppository) 10 mg DAILYPRN  PRN ND CONSTIPATION;  Start 

2/7/19 at 18:45


Diphenhydramine HCl (Benadryl) 12.5 mg Q4HP  PRN IV ITCHING;  Start 2/10/19 at 

13:30


Docusate Sodium (Colace) 100 mg BID PO  Last administered on 2/13/19at 08:26;  

Start 2/7/19 at 21:00


Fentanyl Citrate (Sublimaze) 25 mcg Q5MP  PRN IV MODERATE PAIN (PS 4-7) Last 

administered on 2/8/19at 23:50;  Start 2/8/19 at 23:30;  Stop 2/9/19 at 00:20;  

Status DC


Heparin Sodium (Porcine) (Heparin)  ASDIRECTED  PRN IV SEE LABEL COMMENTS;  

Start 2/7/19 at 19:00;  Stop 2/8/19 at 23:52;  Status DC


Heparin Sodium (Porcine) 98803 units/IV Miscellaneous Supplies 250 ml @ 0 mls/hr

Q0M IV  Last administered on 2/7/19at 21:07;  Start 2/7/19 at 18:51;  Stop 

2/8/19 at 23:34;  Status DC


Hydromorphone HCl (Dilaudid) 0.5 mg Q15MP  PRN IV MODERATE/SEVERE PAIN (PS 5-10)

Last administered on 2/8/19at 23:58;  Start 2/8/19 at 23:45;  Stop 2/9/19 at 

01:15;  Status DC


Lactated Ringer's 1,000 ml @  100 mls/hr Q10H IV ;  Start 2/8/19 at 23:30;  Stop

2/9/19 at 01:00;  Status DC


Magnesium Hydroxide (Milk Of Magnesia) 30 ml DAILYPRN  PRN PO CONSTIPATION Last 

administered on 2/13/19at 08:26;  Start 2/7/19 at 18:45


Metoclopramide HCl (REGLAN INJection) 10 mg Q6HP  PRN IV IF N/V OERSISTS IN 

RECOVERY Last administered on 2/9/19at 00:33;  Start 2/9/19 at 00:30;  Stop 

2/9/19 at 01:30;  Status DC


Morphine Sulfate (Morphine Sulfate In 0.9%Nacl Iv Bag) 1 mg ASDIRECTED  PRN IV 

SEE LABEL COMMENTS Last administered on 2/12/19at 14:00;  Start 2/10/19 at 

13:30;  Stop 2/13/19 at 15:44;  Status DC


Morphine Sulfate (Morphine Sulfate Inj) 2 mg Q30MP  PRN IV SEVERE PAIN (PS 8-10)

Last administered on 2/10/19at 12:34;  Start 2/9/19 at 01:15;  Stop 2/10/19 at 

13:33;  Status DC


Nalbuphine HCl (Nubain) 2.5 mg Q6HP  PRN IV PRURITIS;  Start 2/10/19 at 13:30;  

Stop 2/17/19 at 13:29


Naloxone HCl (Narcan) 0.1 mg Q5MP  PRN IV SEE LABEL COMMENTS;  Start 2/10/19 at 

13:30


Non-Formulary Medication (Epidural/PCA Keys) USE THIS ENTRY TO VEND ... Q1M  PRN

XX SEE LABEL COMMENTS;  Start 2/10/19 at 13:30;  Status Cancel


Non-Formulary Medication (Heparin Iv Rate Change Documentation ml/ Hr)  

ASDIRECTED XX ;  Start 2/7/19 at 19:00;  Stop 2/8/19 at 23:51;  Status DC


Ondansetron HCl (ZOFRAN INJection) 4 mg Q6HP  PRN IV NAUSEA;  Start 2/10/19 at 

13:30


Ondansetron HCl (ZOFRAN INJection) 4 mg Q6HP  PRN IV NAUSEA OR VOMITING Last 

administered on 2/9/19at 01:23;  Start 2/9/19 at 01:15


Oxycodone/ Acetaminophen (Percocet 5mg/ 325mg Tablet) 1 tab ASDIRECTED  PRN PO 

MILD/MODERATE PAIN (PS 1-7) Last administered on 2/9/19at 00:09;  Start 2/8/19 

at 23:30;  Stop 2/9/19 at 01:00;  Status DC


Oxycodone/ Acetaminophen (Percocet 5mg/ 325mg Tablet) 1 tab Q2HP  PRN PO 

MILD/MODERATE PAIN (PS 1-7);  Start 2/14/19 at 23:00


Oxycodone/ Acetaminophen (Percocet 5mg/ 325mg Tablet) 2 tab Q4HP  PRN PO SEVERE 

PAIN (PS 8-10) Last administered on 2/14/19at 21:28;  Start 2/14/19 at 01:15


Senna/Docusate Sodium (Senokot S) 1 tab BID PO  Last administered on 2/13/19at 

08:26;  Start 2/7/19 at 21:00


Sodium Chloride 1,000 ml @  15 mls/hr Q24H IV  Last administered on 2/13/19at 

02:24;  Start 2/10/19 at 13:29;  Stop 2/13/19 at 15:44;  Status DC





Allergies


Coded Allergies:  


     No Known Allergies (Unverified , 1/29/19)





VITAL SIGNS


VITAL SIGNS





Vital Signs








  Date Time  Temp Pulse Resp B/P (MAP) Pulse Ox O2 Delivery O2 Flow Rate FiO2


 


2/14/19 21:28   19     


 


2/14/19 17:13   18     


 


2/14/19 16:43   17     


 


2/14/19 14:00 96.5 88 17 162/77 (105) 100   


 


2/14/19 12:51   18     


 


2/14/19 08:48   18     


 


2/14/19 06:00 98.9 70 17 132/68 (89) 98   


 


2/14/19 04:34   18     


 


2/14/19 00:39   18     














Intake & Output 


 


 2/14/19





 06:00


 


Intake Total 3160 ml


 


Output Total 4200 ml


 


Balance -1040 ml








Current Medications








 Medications


  (Trade)  Dose


 Ordered  Sig/Srinivasa


 Route


 PRN Reason  Start Time


 Stop Time Status Last Admin


Dose Admin


 


 Docusate Sodium


  (Colace)  100 mg  BID


 PO


   2/7/19 21:00


    2/13/19 08:26





 


 Magnesium


 Hydroxide


  (Milk Of


 Magnesia)  30 ml  DAILYPRN  PRN


 PO


 CONSTIPATION  2/7/19 18:45


    2/13/19 08:26





 


 Ondansetron HCl


  (ZOFRAN


 INJection)  4 mg  Q6HP  PRN


 IV


 NAUSEA OR VOMITING  2/9/19 01:15


    2/9/19 01:23





 


 Oxycodone/


 Acetaminophen


  (Percocet 5mg/


 325mg Tablet)  2 tab  Q4HP  PRN


 PO


 SEVERE PAIN (PS 8-10)  2/14/19 01:15


    2/14/19 21:28





 


 Senna/Docusate


 Sodium


  (Senokot S)  1 tab  BID


 PO


   2/7/19 21:00


    2/13/19 08:26














Objective


Physical Examination


General Exam:  Positive: Alert, Cooperative, Mild Distress


Eye Exam:  Positive: PERRLA, Conjunctiva & lids normal, EOMI


ENT Exam:  Positive: Atraumatic, Mucous membr. moist/pink, Pharynx Normal, 

Tongue Midline, Pharyngeal Edema, Nares Patent, Ext Auditory Canal Nml, Pinna 

Normal


Neck Exam:  Positive: Supple, +2 carotid pulse wo bruit; 


   Negative: JVD, thyromegaly, Lymphadenopathy


Chest Exam:  Positive: Clear to auscultation, Normal air movement; 


   Negative: Rales, Rhonchi, Wheezing, Diminished


Heart Exam:  Positive: Rate Normal; 


   Negative: Tachycardic, Bradycardic, Regular Rhythm, Irregular Rhythm, 

Gallops, Murmurs, Rubs


Telemetry:  Positive: No significant arrhythmia, Sinus


Abdomen Exam:  Positive: Normal bowel sounds, Soft; 


   Negative: BS Hyperactive, BS Hypoactive, Tenderness, Hepatospenomegaly, Mass,

Hernia


Male  Exam:  Positive: Normal Genital Exam


Extremity Exam:  Positive: Normal pulses (patient with nonpalpable popliteal 

dorsalis pedis and posterior tibial pulses in the right lower extremity.  The 

pulses in the right lower extremity are obtainable with continuous wave Doppler 

ultrasound and are monophasic.); 


   Negative: Clubbing, Cyanosis, Edema, Tenderness, Swelling


Skin Exam:  Positive: Nl turgor and temperature; 


   Negative: Rash, Breakdown, Lesion, Pruritus


Neuro Exam:  Positive: Normal Gait, Normal Speech, Strength at 5/5 X4 ext, 

Normal Tone, Sensation Intact, Cranial Nerves 3-12 NL, Reflexes 2+


Psych Exam:  Positive: Mental status NL, Mood NL, Memory Intact, Oriented x 3; 


   Negative: Anxiety





Assessment/Plan


Assessment


Patient is status post attempted revascularization of the right lower extremity.

 Patient will continue with oral pain medication.  Patient will continue to work

with physical therapy.





Plan


Patient will continue to work with physical therapy and once cleared will be 

discharged home.  Patient was again counseled on the need to stop tobacco use 

and start an aggressive exercise program once discharged as well as patient 

being discharged on Plavix and cilostazol.











Marcus Howard MD              Feb 14, 2019 23:44

## 2019-02-15 VITALS — DIASTOLIC BLOOD PRESSURE: 78 MMHG | SYSTOLIC BLOOD PRESSURE: 139 MMHG

## 2019-02-15 VITALS — SYSTOLIC BLOOD PRESSURE: 141 MMHG | DIASTOLIC BLOOD PRESSURE: 62 MMHG

## 2019-02-15 VITALS — DIASTOLIC BLOOD PRESSURE: 79 MMHG | SYSTOLIC BLOOD PRESSURE: 135 MMHG

## 2019-02-15 VITALS — SYSTOLIC BLOOD PRESSURE: 147 MMHG | DIASTOLIC BLOOD PRESSURE: 91 MMHG

## 2019-02-15 VITALS — SYSTOLIC BLOOD PRESSURE: 127 MMHG | DIASTOLIC BLOOD PRESSURE: 58 MMHG

## 2019-02-15 RX ADMIN — DOCUSATE SODIUM,SENNOSIDES SCH TAB: 50; 8.6 TABLET, FILM COATED ORAL at 07:36

## 2019-02-15 RX ADMIN — DOCUSATE SODIUM,SENNOSIDES SCH TAB: 50; 8.6 TABLET, FILM COATED ORAL at 20:26

## 2019-02-15 RX ADMIN — DOCUSATE SODIUM SCH MG: 100 CAPSULE, LIQUID FILLED ORAL at 20:26

## 2019-02-15 RX ADMIN — DOCUSATE SODIUM SCH MG: 100 CAPSULE, LIQUID FILLED ORAL at 07:35

## 2019-02-16 VITALS — DIASTOLIC BLOOD PRESSURE: 76 MMHG | SYSTOLIC BLOOD PRESSURE: 161 MMHG

## 2019-02-16 VITALS — SYSTOLIC BLOOD PRESSURE: 135 MMHG | DIASTOLIC BLOOD PRESSURE: 77 MMHG

## 2019-02-16 VITALS — SYSTOLIC BLOOD PRESSURE: 137 MMHG | DIASTOLIC BLOOD PRESSURE: 78 MMHG

## 2019-02-16 VITALS — SYSTOLIC BLOOD PRESSURE: 149 MMHG | DIASTOLIC BLOOD PRESSURE: 72 MMHG

## 2019-02-16 LAB
C-ANCA TITR SER IF: (no result) TITER
ENA SS-A AB SER-ACNC: <0.2 AI (ref 0–0.9)
ENA SS-B AB SER-ACNC: <0.2 AI (ref 0–0.9)
P-ANCA ATYPICAL TITR SER IF: (no result) TITER
P-ANCA TITR SER IF: (no result) TITER

## 2019-02-16 RX ADMIN — DOCUSATE SODIUM SCH MG: 100 CAPSULE, LIQUID FILLED ORAL at 10:33

## 2019-02-16 RX ADMIN — DOCUSATE SODIUM,SENNOSIDES SCH TAB: 50; 8.6 TABLET, FILM COATED ORAL at 20:00

## 2019-02-16 RX ADMIN — DOCUSATE SODIUM SCH MG: 100 CAPSULE, LIQUID FILLED ORAL at 20:00

## 2019-02-16 RX ADMIN — DOCUSATE SODIUM,SENNOSIDES SCH TAB: 50; 8.6 TABLET, FILM COATED ORAL at 09:00

## 2019-02-16 NOTE — IPNPDOC
Subjective


General


Date/Time Seen


The patient was seen on 2/16/19 at 19:41.





Subject


Chief Complaint/History


The patient is a 23-year-old male admitted with a reason for visit of 

Claudication.  Patient has some improvement in his pain.  Patient is able to 

ambulate better.





Current Medications


Current Medications





Current Medications


Acetaminophen/ Hydrocodone Bitart (Norco, Anexsia 5/325) 1 tab Q4HP  PRN PO 

MODERATE PAIN (PS 5-7) Last administered on 2/9/19at 18:01;  Start 2/7/19 at 

18:45;  Stop 2/9/19 at 20:44;  Status DC


Acetaminophen/ Hydrocodone Bitart (Norco, Anexsia 5/325) 2 tab Q4HP  PRN PO 

SEVERE PAIN (PS 8-10) Last administered on 2/14/19at 00:39;  Start 2/13/19 at 

15:45;  Stop 2/14/19 at 01:06;  Status DC


Acetaminophen/ Hydrocodone Bitart (Norco, Anexsia 5/325) 2 tab Q4HP  PRN PO 

SEVERE PAIN (PS 8-10) Last administered on 2/10/19at 12:06;  Start 2/9/19 at 

20:45;  Stop 2/10/19 at 13:33;  Status DC


Bisacodyl (Dulcolax Suppository) 10 mg DAILYPRN  PRN HI CONSTIPATION;  Start 

2/7/19 at 18:45


Diphenhydramine HCl (Benadryl) 12.5 mg Q4HP  PRN IV ITCHING;  Start 2/10/19 at 

13:30


Docusate Sodium (Colace) 100 mg BID PO  Last administered on 2/16/19at 10:33;  

Start 2/7/19 at 21:00


Fentanyl Citrate (Sublimaze) 25 mcg Q5MP  PRN IV MODERATE PAIN (PS 4-7) Last 

administered on 2/8/19at 23:50;  Start 2/8/19 at 23:30;  Stop 2/9/19 at 00:20;  

Status DC


Heparin Sodium (Porcine) (Heparin)  ASDIRECTED  PRN IV SEE LABEL COMMENTS;  

Start 2/7/19 at 19:00;  Stop 2/8/19 at 23:52;  Status DC


Heparin Sodium (Porcine) 96485 units/IV Miscellaneous Supplies 250 ml @ 0 mls/hr

Q0M IV  Last administered on 2/7/19at 21:07;  Start 2/7/19 at 18:51;  Stop 2/ 8/19 at 23:34;  Status DC


Hydromorphone HCl (Dilaudid) 0.5 mg Q15MP  PRN IV MODERATE/SEVERE PAIN (PS 5-10)

Last administered on 2/8/19at 23:58;  Start 2/8/19 at 23:45;  Stop 2/9/19 at 

01:15;  Status DC


Lactated Ringer's 1,000 ml @  100 mls/hr Q10H IV ;  Start 2/8/19 at 23:30;  Stop

2/9/19 at 01:00;  Status DC


Magnesium Hydroxide (Milk Of Magnesia) 30 ml DAILYPRN  PRN PO CONSTIPATION Last 

administered on 2/13/19at 08:26;  Start 2/7/19 at 18:45


Metoclopramide HCl (REGLAN INJection) 10 mg Q6HP  PRN IV IF N/V OERSISTS IN 

RECOVERY Last administered on 2/9/19at 00:33;  Start 2/9/19 at 00:30;  Stop 

2/9/19 at 01:30;  Status DC


Morphine Sulfate (Morphine Sulfate In 0.9%Nacl Iv Bag) 1 mg ASDIRECTED  PRN IV 

SEE LABEL COMMENTS Last administered on 2/12/19at 14:00;  Start 2/10/19 at 

13:30;  Stop 2/13/19 at 15:44;  Status DC


Morphine Sulfate (Morphine Sulfate Inj) 2 mg Q30MP  PRN IV SEVERE PAIN (PS 8-10)

Last administered on 2/10/19at 12:34;  Start 2/9/19 at 01:15;  Stop 2/10/19 at 

13:33;  Status DC


Nalbuphine HCl (Nubain) 2.5 mg Q6HP  PRN IV PRURITIS;  Start 2/10/19 at 13:30;  

Stop 2/17/19 at 13:29


Naloxone HCl (Narcan) 0.1 mg Q5MP  PRN IV SEE LABEL COMMENTS;  Start 2/10/19 at 

13:30


Non-Formulary Medication (Epidural/PCA Keys) USE THIS ENTRY TO VEND ... Q1M  PRN

XX SEE LABEL COMMENTS;  Start 2/10/19 at 13:30;  Status Cancel


Non-Formulary Medication (Heparin Iv Rate Change Documentation ml/ Hr)  ASD

IRECTED XX ;  Start 2/7/19 at 19:00;  Stop 2/8/19 at 23:51;  Status DC


Ondansetron HCl (ZOFRAN INJection) 4 mg Q6HP  PRN IV NAUSEA;  Start 2/10/19 at 

13:30


Ondansetron HCl (ZOFRAN INJection) 4 mg Q6HP  PRN IV NAUSEA OR VOMITING Last 

administered on 2/9/19at 01:23;  Start 2/9/19 at 01:15


Oxycodone/ Acetaminophen (Percocet 5mg/ 325mg Tablet) 1 tab ASDIRECTED  PRN PO 

MILD/MODERATE PAIN (PS 1-7) Last administered on 2/9/19at 00:09;  Start 2/8/19 

at 23:30;  Stop 2/9/19 at 01:00;  Status DC


Oxycodone/ Acetaminophen (Percocet 5mg/ 325mg Tablet) 1 tab Q2HP  PRN PO 

MILD/MODERATE PAIN (PS 1-7) Last administered on 2/16/19at 18:00;  Start 2/14/19

at 23:00


Oxycodone/ Acetaminophen (Percocet 5mg/ 325mg Tablet) 2 tab Q4HP  PRN PO SEVERE 

PAIN (PS 8-10) Last administered on 2/14/19at 21:28;  Start 2/14/19 at 01:15


Senna/Docusate Sodium (Senokot S) 1 tab BID PO  Last administered on 2/13/19at 

08:26;  Start 2/7/19 at 21:00


Sodium Chloride 1,000 ml @  15 mls/hr Q24H IV  Last administered on 2/13/19at 

02:24;  Start 2/10/19 at 13:29;  Stop 2/13/19 at 15:44;  Status DC





Allergies


Coded Allergies:  


     No Known Allergies (Unverified , 1/29/19)





VITAL SIGNS


VITAL SIGNS





Vital Signs








  Date Time  Temp Pulse Resp B/P (MAP) Pulse Ox O2 Delivery O2 Flow Rate FiO2


 


2/16/19 18:00   16     


 


2/16/19 16:41   16     


 


2/16/19 16:11   16     


 


2/16/19 14:00 97.9 87 18 137/78 (97) 100   


 


2/16/19 10:44   16     


 


2/16/19 08:51   16     


 


2/16/19 06:10   18     


 


2/16/19 06:00 98.6 86 20 135/77 (96) 98   


 


2/16/19 03:19   18     


 


2/16/19 02:00 98.7 94 20 161/76 (104) 100   


 


2/16/19 01:05   18     


 


2/15/19 22:28   18     


 


2/15/19 22:00 98.5 103 18 147/91 (109) 100   














Intake & Output 


 


 2/16/19





 06:00


 


Intake Total 2740 ml


 


Output Total 2325 ml


 


Balance 415 ml








Current Medications








 Medications


  (Trade)  Dose


 Ordered  Sig/Srinivasa


 Route


 PRN Reason  Start Time


 Stop Time Status Last Admin


Dose Admin


 


 Docusate Sodium


  (Colace)  100 mg  BID


 PO


   2/7/19 21:00


    2/16/19 10:33





 


 Magnesium


 Hydroxide


  (Milk Of


 Magnesia)  30 ml  DAILYPRN  PRN


 PO


 CONSTIPATION  2/7/19 18:45


    2/13/19 08:26





 


 Ondansetron HCl


  (ZOFRAN


 INJection)  4 mg  Q6HP  PRN


 IV


 NAUSEA OR VOMITING  2/9/19 01:15


    2/9/19 01:23





 


 Oxycodone/


 Acetaminophen


  (Percocet 5mg/


 325mg Tablet)  1 tab  Q2HP  PRN


 PO


 MILD/MODERATE PAIN (PS 1-7)  2/14/19 23:00


    2/16/19 18:00





 


 Senna/Docusate


 Sodium


  (Senokot S)  1 tab  BID


 PO


   2/7/19 21:00


    2/13/19 08:26














Objective


Physical Examination


General Exam:  Positive: Alert, Cooperative, Mild Distress


Eye Exam:  Positive: PERRLA, Conjunctiva & lids normal, EOMI


ENT Exam:  Positive: Atraumatic, Mucous membr. moist/pink, Pharynx Normal, 

Tongue Midline, Pharyngeal Edema, Nares Patent, Ext Auditory Canal Nml, Pinna 

Normal


Neck Exam:  Positive: Supple, +2 carotid pulse wo bruit; 


   Negative: JVD, thyromegaly, Lymphadenopathy


Chest Exam:  Positive: Clear to auscultation, Normal air movement; 


   Negative: Rales, Rhonchi, Wheezing, Diminished


Heart Exam:  Positive: Rate Normal; 


   Negative: Tachycardic, Bradycardic, Regular Rhythm, Irregular Rhythm, 

Gallops, Murmurs, Rubs


Telemetry:  Positive: No significant arrhythmia, Sinus


Abdomen Exam:  Positive: Normal bowel sounds, Soft; 


   Negative: BS Hyperactive, BS Hypoactive, Tenderness, Hepatospenomegaly, Mass,

Hernia


Male  Exam:  Positive: Normal Genital Exam


Extremity Exam:  Positive: Normal pulses (patient with nonpalpable popliteal 

dorsalis pedis and posterior tibial pulses in the right lower extremity.  The 

pulses in the right lower extremity are obtainable with continuous wave Doppler 

ultrasound and are monophasic.); 


   Negative: Clubbing, Cyanosis, Edema, Tenderness, Swelling


Skin Exam:  Positive: Nl turgor and temperature; 


   Negative: Rash, Breakdown, Lesion, Pruritus


Neuro Exam:  Positive: Normal Gait, Normal Speech, Strength at 5/5 X4 ext, 

Normal Tone, Sensation Intact, Cranial Nerves 3-12 NL, Reflexes 2+


Psych Exam:  Positive: Mental status NL, Mood NL, Memory Intact, Oriented x 3; 


   Negative: Anxiety





Assessment/Plan


Assessment


Patient is improving albeit slowly.  Patient's pain is better controlled.





Plan


Patient is slowly improving and we'll continue to work with physical therapy and

been discharged home once cleared by physical therapy.











Marcus Howard MD              Feb 16, 2019 19:42

## 2019-02-17 VITALS — DIASTOLIC BLOOD PRESSURE: 75 MMHG | SYSTOLIC BLOOD PRESSURE: 140 MMHG

## 2019-02-17 VITALS — SYSTOLIC BLOOD PRESSURE: 138 MMHG | DIASTOLIC BLOOD PRESSURE: 64 MMHG

## 2019-02-17 VITALS — SYSTOLIC BLOOD PRESSURE: 145 MMHG | DIASTOLIC BLOOD PRESSURE: 87 MMHG

## 2019-02-17 RX ADMIN — DOCUSATE SODIUM SCH MG: 100 CAPSULE, LIQUID FILLED ORAL at 20:00

## 2019-02-17 RX ADMIN — DOCUSATE SODIUM,SENNOSIDES SCH TAB: 50; 8.6 TABLET, FILM COATED ORAL at 20:00

## 2019-02-17 RX ADMIN — DOCUSATE SODIUM SCH MG: 100 CAPSULE, LIQUID FILLED ORAL at 09:00

## 2019-02-17 RX ADMIN — DOCUSATE SODIUM,SENNOSIDES SCH TAB: 50; 8.6 TABLET, FILM COATED ORAL at 09:00

## 2019-02-18 VITALS — DIASTOLIC BLOOD PRESSURE: 78 MMHG | SYSTOLIC BLOOD PRESSURE: 138 MMHG

## 2019-02-18 VITALS — SYSTOLIC BLOOD PRESSURE: 139 MMHG | DIASTOLIC BLOOD PRESSURE: 74 MMHG

## 2019-02-18 VITALS — SYSTOLIC BLOOD PRESSURE: 138 MMHG | DIASTOLIC BLOOD PRESSURE: 77 MMHG

## 2019-02-18 RX ADMIN — DOCUSATE SODIUM,SENNOSIDES SCH TAB: 50; 8.6 TABLET, FILM COATED ORAL at 09:47

## 2019-02-18 RX ADMIN — DOCUSATE SODIUM SCH MG: 100 CAPSULE, LIQUID FILLED ORAL at 21:21

## 2019-02-18 RX ADMIN — DOCUSATE SODIUM SCH MG: 100 CAPSULE, LIQUID FILLED ORAL at 09:47

## 2019-02-18 RX ADMIN — DOCUSATE SODIUM,SENNOSIDES SCH TAB: 50; 8.6 TABLET, FILM COATED ORAL at 21:21

## 2019-02-19 VITALS — SYSTOLIC BLOOD PRESSURE: 144 MMHG | DIASTOLIC BLOOD PRESSURE: 77 MMHG

## 2019-02-19 VITALS — SYSTOLIC BLOOD PRESSURE: 141 MMHG | DIASTOLIC BLOOD PRESSURE: 85 MMHG

## 2019-02-19 VITALS — DIASTOLIC BLOOD PRESSURE: 66 MMHG | SYSTOLIC BLOOD PRESSURE: 137 MMHG

## 2019-02-19 RX ADMIN — DOCUSATE SODIUM,SENNOSIDES SCH TAB: 50; 8.6 TABLET, FILM COATED ORAL at 21:25

## 2019-02-19 RX ADMIN — DOCUSATE SODIUM SCH MG: 100 CAPSULE, LIQUID FILLED ORAL at 08:19

## 2019-02-19 RX ADMIN — DOCUSATE SODIUM SCH MG: 100 CAPSULE, LIQUID FILLED ORAL at 21:25

## 2019-02-19 RX ADMIN — DOCUSATE SODIUM,SENNOSIDES SCH TAB: 50; 8.6 TABLET, FILM COATED ORAL at 08:19

## 2019-02-20 VITALS — SYSTOLIC BLOOD PRESSURE: 144 MMHG | DIASTOLIC BLOOD PRESSURE: 90 MMHG

## 2019-02-20 VITALS — DIASTOLIC BLOOD PRESSURE: 77 MMHG | SYSTOLIC BLOOD PRESSURE: 140 MMHG

## 2019-02-20 VITALS — SYSTOLIC BLOOD PRESSURE: 128 MMHG | DIASTOLIC BLOOD PRESSURE: 71 MMHG

## 2019-02-20 RX ADMIN — DOCUSATE SODIUM,SENNOSIDES SCH TAB: 50; 8.6 TABLET, FILM COATED ORAL at 08:38

## 2019-02-20 RX ADMIN — DOCUSATE SODIUM SCH MG: 100 CAPSULE, LIQUID FILLED ORAL at 21:29

## 2019-02-20 RX ADMIN — DOCUSATE SODIUM,SENNOSIDES SCH TAB: 50; 8.6 TABLET, FILM COATED ORAL at 21:28

## 2019-02-20 RX ADMIN — DOCUSATE SODIUM SCH MG: 100 CAPSULE, LIQUID FILLED ORAL at 08:38

## 2019-02-20 NOTE — IPNPDOC
Subjective


General


Date/Time Seen


The patient was seen on 2/20/19 at 20:31.





Subject


Chief Complaint/History


The patient is a 23-year-old male admitted with a reason for visit of 

Claudication.  Patient's pain is improving and controlled better.





Current Medications


Current Medications





Current Medications


Acetaminophen/ Hydrocodone Bitart (Norco, Anexsia 5/325) 1 tab Q4HP  PRN PO 

MODERATE PAIN (PS 5-7) Last administered on 2/9/19at 18:01;  Start 2/7/19 at 

18:45;  Stop 2/9/19 at 20:44;  Status DC


Acetaminophen/ Hydrocodone Bitart (Norco, Anexsia 5/325) 2 tab Q4HP  PRN PO 

SEVERE PAIN (PS 8-10) Last administered on 2/14/19at 00:39;  Start 2/13/19 at 

15:45;  Stop 2/14/19 at 01:06;  Status DC


Acetaminophen/ Hydrocodone Bitart (Norco, Anexsia 5/325) 2 tab Q4HP  PRN PO 

SEVERE PAIN (PS 8-10) Last administered on 2/10/19at 12:06;  Start 2/9/19 at 

20:45;  Stop 2/10/19 at 13:33;  Status DC


Bisacodyl (Dulcolax Suppository) 10 mg DAILYPRN  PRN NE CONSTIPATION;  Start 

2/7/19 at 18:45


Diphenhydramine HCl (Benadryl) 12.5 mg Q4HP  PRN IV ITCHING;  Start 2/10/19 at 

13:30


Docusate Sodium (Colace) 100 mg BID PO  Last administered on 2/20/19at 08:38;  

Start 2/7/19 at 21:00


Fentanyl Citrate (Sublimaze) 25 mcg Q5MP  PRN IV MODERATE PAIN (PS 4-7) Last 

administered on 2/8/19at 23:50;  Start 2/8/19 at 23:30;  Stop 2/9/19 at 00:20;  

Status DC


Heparin Sodium (Porcine) (Heparin)  ASDIRECTED  PRN IV SEE LABEL COMMENTS;  

Start 2/7/19 at 19:00;  Stop 2/8/19 at 23:52;  Status DC


Heparin Sodium (Porcine) 87580 units/IV Miscellaneous Supplies 250 ml @ 0 mls/hr

Q0M IV  Last administered on 2/7/19at 21:07;  Start 2/7/19 at 18:51;  Stop 

2/8/19 at 23:34;  Status DC


Hydromorphone HCl (Dilaudid) 0.5 mg Q15MP  PRN IV MODERATE/SEVERE PAIN (PS 5-10)

Last administered on 2/8/19at 23:58;  Start 2/8/19 at 23:45;  Stop 2/9/19 at 

01:15;  Status DC


Lactated Ringer's 1,000 ml @  100 mls/hr Q10H IV ;  Start 2/8/19 at 23:30;  Stop

2/9/19 at 01:00;  Status DC


Magnesium Hydroxide (Milk Of Magnesia) 30 ml DAILYPRN  PRN PO CONSTIPATION Last 

administered on 2/13/19at 08:26;  Start 2/7/19 at 18:45


Metoclopramide HCl (REGLAN INJection) 10 mg Q6HP  PRN IV IF N/V OERSISTS IN 

RECOVERY Last administered on 2/9/19at 00:33;  Start 2/9/19 at 00:30;  Stop 

2/9/19 at 01:30;  Status DC


Morphine Sulfate (Morphine Sulfate In 0.9%Nacl Iv Bag) 1 mg ASDIRECTED  PRN IV 

SEE LABEL COMMENTS Last administered on 2/12/19at 14:00;  Start 2/10/19 at 

13:30;  Stop 2/13/19 at 15:44;  Status DC


Morphine Sulfate (Morphine Sulfate Inj) 2 mg Q30MP  PRN IV SEVERE PAIN (PS 8-10)

Last administered on 2/10/19at 12:34;  Start 2/9/19 at 01:15;  Stop 2/10/19 at 

13:33;  Status DC


Nalbuphine HCl (Nubain) 2.5 mg Q6HP  PRN IV PRURITIS;  Start 2/10/19 at 13:30;  

Stop 2/17/19 at 13:29;  Status DC


Naloxone HCl (Narcan) 0.1 mg Q5MP  PRN IV SEE LABEL COMMENTS;  Start 2/10/19 at 

13:30


Non-Formulary Medication (Epidural/PCA Keys) USE THIS ENTRY TO VEND ... Q1M  PRN

XX SEE LABEL COMMENTS;  Start 2/10/19 at 13:30;  Status Cancel


Non-Formulary Medication (Heparin Iv Rate Change Documentation ml/ Hr)  

ASDIRECTED XX ;  Start 2/7/19 at 19:00;  Stop 2/8/19 at 23:51;  Status DC


Ondansetron HCl (ZOFRAN INJection) 4 mg Q6HP  PRN IV NAUSEA;  Start 2/10/19 at 

13:30


Ondansetron HCl (ZOFRAN INJection) 4 mg Q6HP  PRN IV NAUSEA OR VOMITING Last 

administered on 2/9/19at 01:23;  Start 2/9/19 at 01:15


Oxycodone/ Acetaminophen (Percocet 5mg/ 325mg Tablet) 1 tab ASDIRECTED  PRN PO 

MILD/MODERATE PAIN (PS 1-7) Last administered on 2/9/19at 00:09;  Start 2/8/19 

at 23:30;  Stop 2/9/19 at 01:00;  Status DC


Oxycodone/ Acetaminophen (Percocet 5mg/ 325mg Tablet) 1 tab Q2HP  PRN PO 

MILD/MODERATE PAIN (PS 1-7) Last administered on 2/20/19at 16:56;  Start 2/14/19

at 23:00


Oxycodone/ Acetaminophen (Percocet 5mg/ 325mg Tablet) 2 tab Q4HP  PRN PO SEVERE 

PAIN (PS 8-10) Last administered on 2/20/19at 08:40;  Start 2/14/19 at 01:15


Senna/Docusate Sodium (Senokot S) 1 tab BID PO  Last administered on 2/20/19at 

08:38;  Start 2/7/19 at 21:00


Sodium Chloride 1,000 ml @  15 mls/hr Q24H IV  Last administered on 2/13/19at 

02:24;  Start 2/10/19 at 13:29;  Stop 2/13/19 at 15:44;  Status DC





Allergies


Coded Allergies:  


     No Known Allergies (Unverified , 1/29/19)





VITAL SIGNS


VITAL SIGNS





Microbiology


2/18/19 Stool Occult Blood (SHAUN) - Final, Complete


          





Vital Signs








  Date Time  Temp Pulse Resp B/P (MAP) Pulse Ox O2 Delivery O2 Flow Rate FiO2


 


2/20/19 17:32   20     


 


2/20/19 16:56   20     


 


2/20/19 14:22   20     


 


2/20/19 14:00 97.8 85 16 144/90 (108) 99   


 


2/20/19 09:18   20     


 


2/20/19 08:40   20     


 


2/20/19 06:00 97.1 87 18 128/71 (90) 100   


 


2/20/19 03:56   18     


 


2/19/19 23:36   18     


 


2/19/19 21:25   18     














Intake & Output 


 


 2/20/19





 06:00


 


Intake Total 2130 ml


 


Output Total 1650 ml


 


Balance 480 ml








Microbiology


2/18/19 Stool Occult Blood (SHAUN) - Final, Complete


          





Current Medications








 Medications


  (Trade)  Dose


 Ordered  Sig/Srinivasa


 Route


 PRN Reason  Start Time


 Stop Time Status Last Admin


Dose Admin


 


 Docusate Sodium


  (Colace)  100 mg  BID


 PO


   2/7/19 21:00


    2/20/19 08:38





 


 Magnesium


 Hydroxide


  (Milk Of


 Magnesia)  30 ml  DAILYPRN  PRN


 PO


 CONSTIPATION  2/7/19 18:45


    2/13/19 08:26





 


 Ondansetron HCl


  (ZOFRAN


 INJection)  4 mg  Q6HP  PRN


 IV


 NAUSEA OR VOMITING  2/9/19 01:15


    2/9/19 01:23





 


 Oxycodone/


 Acetaminophen


  (Percocet 5mg/


 325mg Tablet)  1 tab  Q2HP  PRN


 PO


 MILD/MODERATE PAIN (PS 1-7)  2/14/19 23:00


    2/20/19 16:56





 


 Senna/Docusate


 Sodium


  (Senokot S)  1 tab  BID


 PO


   2/7/19 21:00


    2/20/19 08:38














Objective


Physical Examination


General Exam:  Positive: Alert, Cooperative, Mild Distress


Eye Exam:  Positive: PERRLA, Conjunctiva & lids normal, EOMI


ENT Exam:  Positive: Atraumatic, Mucous membr. moist/pink, Pharynx Normal, 

Tongue Midline, Pharyngeal Edema, Nares Patent, Ext Auditory Canal Nml, Pinna 

Normal


Neck Exam:  Positive: Supple, +2 carotid pulse wo bruit; 


   Negative: JVD, thyromegaly, Lymphadenopathy


Chest Exam:  Positive: Clear to auscultation, Normal air movement; 


   Negative: Rales, Rhonchi, Wheezing, Diminished


Heart Exam:  Positive: Rate Normal; 


   Negative: Tachycardic, Bradycardic, Regular Rhythm, Irregular Rhythm, 

Gallops, Murmurs, Rubs


Telemetry:  Positive: No significant arrhythmia, Sinus


Abdomen Exam:  Positive: Normal bowel sounds, Soft; 


   Negative: BS Hyperactive, BS Hypoactive, Tenderness, Hepatospenomegaly, Mass,

Hernia


Male  Exam:  Positive: Normal Genital Exam


Extremity Exam:  Positive: Normal pulses (patient with nonpalpable popliteal 

dorsalis pedis and posterior tibial pulses in the right lower extremity.  The 

pulses in the right lower extremity are obtainable with continuous wave Doppler 

ultrasound and are monophasic.); 


   Negative: Clubbing, Cyanosis, Edema, Tenderness, Swelling


Skin Exam:  Positive: Nl turgor and temperature; 


   Negative: Rash, Breakdown, Lesion, Pruritus


Neuro Exam:  Positive: Normal Gait, Normal Speech, Strength at 5/5 X4 ext, 

Normal Tone, Sensation Intact, Cranial Nerves 3-12 NL, Reflexes 2+


Psych Exam:  Positive: Mental status NL, Mood NL, Memory Intact, Oriented x 3; 


   Negative: Anxiety





Assessment/Plan


Assessment


Patient is improving slowly.  Patient continues to ambulate better with 

decreasing pain.





Plan


Patient is improving and continuing to work with physical therapy and will be 

discharged home once cleared by physical therapy.











Marcus Howard MD              Feb 20, 2019 20:32

## 2019-02-21 VITALS — SYSTOLIC BLOOD PRESSURE: 131 MMHG | DIASTOLIC BLOOD PRESSURE: 82 MMHG

## 2019-02-21 VITALS — SYSTOLIC BLOOD PRESSURE: 141 MMHG | DIASTOLIC BLOOD PRESSURE: 80 MMHG

## 2019-02-21 VITALS — SYSTOLIC BLOOD PRESSURE: 150 MMHG | DIASTOLIC BLOOD PRESSURE: 70 MMHG

## 2019-02-21 RX ADMIN — DOCUSATE SODIUM,SENNOSIDES SCH TAB: 50; 8.6 TABLET, FILM COATED ORAL at 09:00

## 2019-02-21 RX ADMIN — DOCUSATE SODIUM,SENNOSIDES SCH TAB: 50; 8.6 TABLET, FILM COATED ORAL at 20:31

## 2019-02-21 RX ADMIN — DOCUSATE SODIUM SCH MG: 100 CAPSULE, LIQUID FILLED ORAL at 20:31

## 2019-02-21 RX ADMIN — DOCUSATE SODIUM SCH MG: 100 CAPSULE, LIQUID FILLED ORAL at 09:00

## 2019-02-21 NOTE — IPNPDOC
Subjective


General


Date/Time Seen


The patient was seen on 2/21/19 at 20:28.





Subject


Chief Complaint/History


The patient is a 23-year-old male admitted with a reason for visit of 

Claudication.  Patient has no new complaints and states his pain is controlled 

well.





Current Medications


Current Medications





Current Medications


Acetaminophen/ Hydrocodone Bitart (Norco, Anexsia 5/325) 1 tab Q4HP  PRN PO 

MODERATE PAIN (PS 5-7) Last administered on 2/9/19at 18:01;  Start 2/7/19 at 

18:45;  Stop 2/9/19 at 20:44;  Status DC


Acetaminophen/ Hydrocodone Bitart (Norco, Anexsia 5/325) 2 tab Q4HP  PRN PO 

SEVERE PAIN (PS 8-10) Last administered on 2/14/19at 00:39;  Start 2/13/19 at 

15:45;  Stop 2/14/19 at 01:06;  Status DC


Acetaminophen/ Hydrocodone Bitart (Norco, Anexsia 5/325) 2 tab Q4HP  PRN PO 

SEVERE PAIN (PS 8-10) Last administered on 2/10/19at 12:06;  Start 2/9/19 at 

20:45;  Stop 2/10/19 at 13:33;  Status DC


Bisacodyl (Dulcolax Suppository) 10 mg DAILYPRN  PRN [place] CONSTIPATION;  Start 

2/7/19 at 18:45


Diphenhydramine HCl (Benadryl) 12.5 mg Q4HP  PRN IV ITCHING;  Start 2/10/19 at 

13:30


Docusate Sodium (Colace) 100 mg BID PO  Last administered on 2/20/19at 21:29;  

Start 2/7/19 at 21:00


Fentanyl Citrate (Sublimaze) 25 mcg Q5MP  PRN IV MODERATE PAIN (PS 4-7) Last 

administered on 2/8/19at 23:50;  Start 2/8/19 at 23:30;  Stop 2/9/19 at 00:20;  

Status DC


Heparin Sodium (Porcine) (Heparin)  ASDIRECTED  PRN IV SEE LABEL COMMENTS;  

Start 2/7/19 at 19:00;  Stop 2/8/19 at 23:52;  Status DC


Heparin Sodium (Porcine) 43251 units/IV Miscellaneous Supplies 250 ml @ 0 mls/hr

Q0M IV  Last administered on 2/7/19at 21:07;  Start 2/7/19 at 18:51;  Stop 

2/8/19 at 23:34;  Status DC


Hydromorphone HCl (Dilaudid) 0.5 mg Q15MP  PRN IV MODERATE/SEVERE PAIN (PS 5-10)

Last administered on 2/8/19at 23:58;  Start 2/8/19 at 23:45;  Stop 2/9/19 at 

01:15;  Status DC


Lactated Ringer's 1,000 ml @  100 mls/hr Q10H IV ;  Start 2/8/19 at 23:30;  Stop

2/9/19 at 01:00;  Status DC


Magnesium Hydroxide (Milk Of Magnesia) 30 ml DAILYPRN  PRN PO CONSTIPATION Last 

administered on 2/13/19at 08:26;  Start 2/7/19 at 18:45


Metoclopramide HCl (REGLAN INJection) 10 mg Q6HP  PRN IV IF N/V OERSISTS IN 

RECOVERY Last administered on 2/9/19at 00:33;  Start 2/9/19 at 00:30;  Stop 

2/9/19 at 01:30;  Status DC


Morphine Sulfate (Morphine Sulfate In 0.9%Nacl Iv Bag) 1 mg ASDIRECTED  PRN IV 

SEE LABEL COMMENTS Last administered on 2/12/19at 14:00;  Start 2/10/19 at 

13:30;  Stop 2/13/19 at 15:44;  Status DC


Morphine Sulfate (Morphine Sulfate Inj) 2 mg Q30MP  PRN IV SEVERE PAIN (PS 8-10)

Last administered on 2/10/19at 12:34;  Start 2/9/19 at 01:15;  Stop 2/10/19 at 

13:33;  Status DC


Nalbuphine HCl (Nubain) 2.5 mg Q6HP  PRN IV PRURITIS;  Start 2/10/19 at 13:30;  

Stop 2/17/19 at 13:29;  Status DC


Naloxone HCl (Narcan) 0.1 mg Q5MP  PRN IV SEE LABEL COMMENTS;  Start 2/10/19 at 

13:30


Non-Formulary Medication (Epidural/PCA Keys) USE THIS ENTRY TO VEND ... Q1M  PRN

XX SEE LABEL COMMENTS;  Start 2/10/19 at 13:30;  Status Cancel


Non-Formulary Medication (Heparin Iv Rate Change Documentation ml/ Hr)  

ASDIRECTED XX ;  Start 2/7/19 at 19:00;  Stop 2/8/19 at 23:51;  Status DC


Ondansetron HCl (ZOFRAN INJection) 4 mg Q6HP  PRN IV NAUSEA;  Start 2/10/19 at 

13:30


Ondansetron HCl (ZOFRAN INJection) 4 mg Q6HP  PRN IV NAUSEA OR VOMITING Last 

administered on 2/9/19at 01:23;  Start 2/9/19 at 01:15


Oxycodone/ Acetaminophen (Percocet 5mg/ 325mg Tablet) 1 tab ASDIRECTED  PRN PO 

MILD/MODERATE PAIN (PS 1-7) Last administered on 2/9/19at 00:09;  Start 2/8/19 

at 23:30;  Stop 2/9/19 at 01:00;  Status DC


Oxycodone/ Acetaminophen (Percocet 5mg/ 325mg Tablet) 1 tab Q2HP  PRN PO 

MILD/MODERATE PAIN (PS 1-7) Last administered on 2/20/19at 16:56;  Start 2/14/19

at 23:00


Oxycodone/ Acetaminophen (Percocet 5mg/ 325mg Tablet) 2 tab Q4HP  PRN PO SEVERE 

PAIN (PS 8-10) Last administered on 2/21/19at 16:33;  Start 2/14/19 at 01:15


Senna/Docusate Sodium (Senokot S) 1 tab BID PO  Last administered on 2/20/19at 

21:28;  Start 2/7/19 at 21:00


Sodium Chloride 1,000 ml @  15 mls/hr Q24H IV  Last administered on 2/13/19at 

02:24;  Start 2/10/19 at 13:29;  Stop 2/13/19 at 15:44;  Status DC





Allergies


Coded Allergies:  


     No Known Allergies (Unverified , 1/29/19)





VITAL SIGNS


VITAL SIGNS





Vital Signs








  Date Time  Temp Pulse Resp B/P (MAP) Pulse Ox O2 Delivery O2 Flow Rate FiO2


 


2/21/19 17:03   18     


 


2/21/19 16:33   18     


 


2/21/19 14:00 96.9 94 18 131/82 (98) 100   


 


2/21/19 11:40   18     


 


2/21/19 07:04   18     


 


2/21/19 06:00 98.3 75 18 150/70 (96) 100   


 


2/21/19 03:03   18     


 


2/20/19 22:48   18     


 


2/20/19 22:00 99.3 91 20 140/77 (98) 100   














Intake & Output 


 


 2/21/19





 06:00


 


Intake Total 1460 ml


 


Output Total 2050 ml


 


Balance -590 ml








Microbiology


2/18/19 Stool Occult Blood (SHAUN) - Final, Complete


          





Current Medications








 Medications


  (Trade)  Dose


 Ordered  Sig/Srinivasa


 Route


 PRN Reason  Start Time


 Stop Time Status Last Admin


Dose Admin


 


 Docusate Sodium


  (Colace)  100 mg  BID


 PO


   2/7/19 21:00


    2/20/19 21:29





 


 Magnesium


 Hydroxide


  (Milk Of


 Magnesia)  30 ml  DAILYPRN  PRN


 PO


 CONSTIPATION  2/7/19 18:45


    2/13/19 08:26





 


 Ondansetron HCl


  (ZOFRAN


 INJection)  4 mg  Q6HP  PRN


 IV


 NAUSEA OR VOMITING  2/9/19 01:15


    2/9/19 01:23





 


 Oxycodone/


 Acetaminophen


  (Percocet 5mg/


 325mg Tablet)  1 tab  Q2HP  PRN


 PO


 MILD/MODERATE PAIN (PS 1-7)  2/14/19 23:00


    2/20/19 16:56





 


 Senna/Docusate


 Sodium


  (Senokot S)  1 tab  BID


 PO


   2/7/19 21:00


    2/20/19 21:28











Microbiology


2/18/19 Stool Occult Blood (SHAUN) - Final, Complete





Objective


Physical Examination


General Exam:  Positive: Alert, Cooperative, Mild Distress


Eye Exam:  Positive: PERRLA, Conjunctiva & lids normal, EOMI


ENT Exam:  Positive: Atraumatic, Mucous membr. moist/pink, Pharynx Normal, 

Tongue Midline, Pharyngeal Edema, Nares Patent, Ext Auditory Canal Nml, Pinna 

Normal


Neck Exam:  Positive: Supple, +2 carotid pulse wo bruit; 


   Negative: JVD, thyromegaly, Lymphadenopathy


Chest Exam:  Positive: Clear to auscultation, Normal air movement; 


   Negative: Rales, Rhonchi, Wheezing, Diminished


Heart Exam:  Positive: Rate Normal; 


   Negative: Tachycardic, Bradycardic, Regular Rhythm, Irregular Rhythm, 

Gallops, Murmurs, Rubs


Telemetry:  Positive: No significant arrhythmia, Sinus


Abdomen Exam:  Positive: Normal bowel sounds, Soft; 


   Negative: BS Hyperactive, BS Hypoactive, Tenderness, Hepatospenomegaly, Mass,

Hernia


Male  Exam:  Positive: Normal Genital Exam


Extremity Exam:  Positive: Normal pulses (patient with nonpalpable popliteal 

dorsalis pedis and posterior tibial pulses in the right lower extremity.  The 

pulses in the right lower extremity are obtainable with continuous wave Doppler 

ultrasound and are monophasic.); 


   Negative: Clubbing, Cyanosis, Edema, Tenderness, Swelling


Skin Exam:  Positive: Nl turgor and temperature; 


   Negative: Rash, Breakdown, Lesion, Pruritus


Neuro Exam:  Positive: Normal Gait, Normal Speech, Strength at 5/5 X4 ext, 

Normal Tone, Sensation Intact, Cranial Nerves 3-12 NL, Reflexes 2+


Psych Exam:  Positive: Mental status NL, Mood NL, Memory Intact, Oriented x 3; 


   Negative: Anxiety





Assessment/Plan


Assessment


Patient improving and working with physical therapy with improved pain control.





Plan


Patient is stable and continues to work with physical therapy and will be 

discharged home once cleared by physical therapy.











Marcus Howard MD              Feb 21, 2019 20:29

## 2019-02-22 VITALS — DIASTOLIC BLOOD PRESSURE: 72 MMHG | SYSTOLIC BLOOD PRESSURE: 132 MMHG

## 2019-02-22 VITALS — SYSTOLIC BLOOD PRESSURE: 141 MMHG | DIASTOLIC BLOOD PRESSURE: 74 MMHG

## 2019-02-22 VITALS — SYSTOLIC BLOOD PRESSURE: 135 MMHG | DIASTOLIC BLOOD PRESSURE: 79 MMHG

## 2019-02-22 RX ADMIN — DOCUSATE SODIUM,SENNOSIDES SCH TAB: 50; 8.6 TABLET, FILM COATED ORAL at 09:00

## 2019-02-22 RX ADMIN — DOCUSATE SODIUM SCH MG: 100 CAPSULE, LIQUID FILLED ORAL at 22:11

## 2019-02-22 RX ADMIN — DOCUSATE SODIUM SCH MG: 100 CAPSULE, LIQUID FILLED ORAL at 09:00

## 2019-02-22 RX ADMIN — DOCUSATE SODIUM,SENNOSIDES SCH TAB: 50; 8.6 TABLET, FILM COATED ORAL at 22:11

## 2019-02-22 RX ADMIN — CILOSTAZOL SCH MG: 100 TABLET ORAL at 17:28

## 2019-02-22 RX ADMIN — GABAPENTIN SCH MG: 100 CAPSULE ORAL at 22:11

## 2019-02-22 NOTE — IPNPDOC
Subjective


General


Date/Time Seen


The patient was seen on 2/22/19 at 16:22.





Subject


Chief Complaint/History


The patient is a 23-year-old male admitted with a reason for visit of 

Claudication.  Patient without complaints.  Patient has been cleared by physical

therapy for discharge but wants to wait and go home tomorrow morning.  Patient 

states his pain is well controlled.





Current Medications


Current Medications





Current Medications


Acetaminophen/ Hydrocodone Bitart (Norco, Anexsia 5/325) 1 tab Q4HP  PRN PO 

MODERATE PAIN (PS 5-7) Last administered on 2/9/19at 18:01;  Start 2/7/19 at 

18:45;  Stop 2/9/19 at 20:44;  Status DC


Acetaminophen/ Hydrocodone Bitart (Norco, Anexsia 5/325) 2 tab Q4HP  PRN PO 

SEVERE PAIN (PS 8-10) Last administered on 2/14/19at 00:39;  Start 2/13/19 at 

15:45;  Stop 2/14/19 at 01:06;  Status DC


Acetaminophen/ Hydrocodone Bitart (Norco, Anexsia 5/325) 2 tab Q4HP  PRN PO 

SEVERE PAIN (PS 8-10) Last administered on 2/10/19at 12:06;  Start 2/9/19 at 

20:45;  Stop 2/10/19 at 13:33;  Status DC


Bisacodyl (Dulcolax Suppository) 10 mg DAILYPRN  PRN [place] CONSTIPATION;  Start 

2/7/19 at 18:45


Diphenhydramine HCl (Benadryl) 12.5 mg Q4HP  PRN IV ITCHING;  Start 2/10/19 at 

13:30


Docusate Sodium (Colace) 100 mg BID PO  Last administered on 2/21/19at 20:31;  

Start 2/7/19 at 21:00


Fentanyl Citrate (Sublimaze) 25 mcg Q5MP  PRN IV MODERATE PAIN (PS 4-7) Last 

administered on 2/8/19at 23:50;  Start 2/8/19 at 23:30;  Stop 2/9/19 at 00:20;  

Status DC


Heparin Sodium (Porcine) (Heparin)  ASDIRECTED  PRN IV SEE LABEL COMMENTS;  

Start 2/7/19 at 19:00;  Stop 2/8/19 at 23:52;  Status DC


Heparin Sodium (Porcine) 53756 units/IV Miscellaneous Supplies 250 ml @ 0 mls/hr

Q0M IV  Last administered on 2/7/19at 21:07;  Start 2/7/19 at 18:51;  Stop 

2/8/19 at 23:34;  Status DC


Hydromorphone HCl (Dilaudid) 0.5 mg Q15MP  PRN IV MODERATE/SEVERE PAIN (PS 5-10)

Last administered on 2/8/19at 23:58;  Start 2/8/19 at 23:45;  Stop 2/9/19 at 

01:15;  Status DC


Lactated Ringer's 1,000 ml @  100 mls/hr Q10H IV ;  Start 2/8/19 at 23:30;  Stop

2/9/19 at 01:00;  Status DC


Magnesium Hydroxide (Milk Of Magnesia) 30 ml DAILYPRN  PRN PO CONSTIPATION Last 

administered on 2/13/19at 08:26;  Start 2/7/19 at 18:45


Metoclopramide HCl (REGLAN INJection) 10 mg Q6HP  PRN IV IF N/V OERSISTS IN 

RECOVERY Last administered on 2/9/19at 00:33;  Start 2/9/19 at 00:30;  Stop 

2/9/19 at 01:30;  Status DC


Morphine Sulfate (Morphine Sulfate In 0.9%Nacl Iv Bag) 1 mg ASDIRECTED  PRN IV 

SEE LABEL COMMENTS Last administered on 2/12/19at 14:00;  Start 2/10/19 at 

13:30;  Stop 2/13/19 at 15:44;  Status DC


Morphine Sulfate (Morphine Sulfate Inj) 2 mg Q30MP  PRN IV SEVERE PAIN (PS 8-10)

Last administered on 2/10/19at 12:34;  Start 2/9/19 at 01:15;  Stop 2/10/19 at 

13:33;  Status DC


Nalbuphine HCl (Nubain) 2.5 mg Q6HP  PRN IV PRURITIS;  Start 2/10/19 at 13:30;  

Stop 2/17/19 at 13:29;  Status DC


Naloxone HCl (Narcan) 0.1 mg Q5MP  PRN IV SEE LABEL COMMENTS;  Start 2/10/19 at 

13:30


Non-Formulary Medication (Epidural/PCA Keys) USE THIS ENTRY TO VEND ... Q1M  PRN

XX SEE LABEL COMMENTS;  Start 2/10/19 at 13:30;  Status Cancel


Non-Formulary Medication (Heparin Iv Rate Change Documentation ml/ Hr)  

ASDIRECTED XX ;  Start 2/7/19 at 19:00;  Stop 2/8/19 at 23:51;  Status DC


Ondansetron HCl (ZOFRAN INJection) 4 mg Q6HP  PRN IV NAUSEA;  Start 2/10/19 at 

13:30


Ondansetron HCl (ZOFRAN INJection) 4 mg Q6HP  PRN IV NAUSEA OR VOMITING Last 

administered on 2/9/19at 01:23;  Start 2/9/19 at 01:15


Oxycodone/ Acetaminophen (Percocet 5mg/ 325mg Tablet) 1 tab ASDIRECTED  PRN PO 

MILD/MODERATE PAIN (PS 1-7) Last administered on 2/9/19at 00:09;  Start 2/8/19 

at 23:30;  Stop 2/9/19 at 01:00;  Status DC


Oxycodone/ Acetaminophen (Percocet 5mg/ 325mg Tablet) 1 tab Q2HP  PRN PO 

MILD/MODERATE PAIN (PS 1-7) Last administered on 2/20/19at 16:56;  Start 2/14/19

at 23:00


Oxycodone/ Acetaminophen (Percocet 5mg/ 325mg Tablet) 2 tab Q4HP  PRN PO SEVERE 

PAIN (PS 8-10) Last administered on 2/22/19at 13:43;  Start 2/14/19 at 01:15


Senna/Docusate Sodium (Senokot S) 1 tab BID PO  Last administered on 2/21/19at 

20:31;  Start 2/7/19 at 21:00


Sodium Chloride 1,000 ml @  15 mls/hr Q24H IV  Last administered on 2/13/19at 

02:24;  Start 2/10/19 at 13:29;  Stop 2/13/19 at 15:44;  Status DC





Allergies


Coded Allergies:  


     No Known Allergies (Unverified , 1/29/19)





VITAL SIGNS


VITAL SIGNS





Vital Signs








  Date Time  Temp Pulse Resp B/P (MAP) Pulse Ox O2 Delivery O2 Flow Rate FiO2


 


2/22/19 14:13   18     


 


2/22/19 14:00 98.0 85 18 141/74 (96) 100   


 


2/22/19 13:43   18     


 


2/22/19 09:09   18     


 


2/22/19 06:00 98.7 77 18 135/79 (97) 98   


 


2/22/19 05:26   18     


 


2/22/19 00:51   18     


 


2/21/19 22:00 97.3 86 18 141/80 (100) 100   


 


2/21/19 20:32   18     


 


2/21/19 16:33   18     














Intake & Output 


 


 2/22/19





 05:59


 


Intake Total 3190 ml


 


Output Total 2100 ml


 


Balance 1090 ml








Microbiology


2/18/19 Stool Occult Blood (SHAUN) - Final, Complete


          





Current Medications








 Medications


  (Trade)  Dose


 Ordered  Sig/Srinivasa


 Route


 PRN Reason  Start Time


 Stop Time Status Last Admin


Dose Admin


 


 Docusate Sodium


  (Colace)  100 mg  BID


 PO


   2/7/19 21:00


    2/21/19 20:31





 


 Magnesium


 Hydroxide


  (Milk Of


 Magnesia)  30 ml  DAILYPRN  PRN


 PO


 CONSTIPATION  2/7/19 18:45


    2/13/19 08:26





 


 Ondansetron HCl


  (ZOFRAN


 INJection)  4 mg  Q6HP  PRN


 IV


 NAUSEA OR VOMITING  2/9/19 01:15


    2/9/19 01:23





 


 Oxycodone/


 Acetaminophen


  (Percocet 5mg/


 325mg Tablet)  1 tab  Q2HP  PRN


 PO


 MILD/MODERATE PAIN (PS 1-7)  2/14/19 23:00


    2/20/19 16:56





 


 Senna/Docusate


 Sodium


  (Senokot S)  1 tab  BID


 PO


   2/7/19 21:00


    2/21/19 20:31











Microbiology


2/18/19 Stool Occult Blood (SHAUN) - Final, Complete





Objective


Physical Examination


General Exam:  Positive: Alert, Cooperative, Mild Distress


Eye Exam:  Positive: PERRLA, Conjunctiva & lids normal, EOMI


ENT Exam:  Positive: Atraumatic, Mucous membr. moist/pink, Pharynx Normal, 

Tongue Midline, Pharyngeal Edema, Nares Patent, Ext Auditory Canal Nml, Pinna 

Normal


Neck Exam:  Positive: Supple, +2 carotid pulse wo bruit; 


   Negative: JVD, thyromegaly, Lymphadenopathy


Chest Exam:  Positive: Clear to auscultation, Normal air movement; 


   Negative: Rales, Rhonchi, Wheezing, Diminished


Heart Exam:  Positive: Rate Normal; 


   Negative: Tachycardic, Bradycardic, Regular Rhythm, Irregular Rhythm, 

Gallops, Murmurs, Rubs


Telemetry:  Positive: No significant arrhythmia, Sinus


Abdomen Exam:  Positive: Normal bowel sounds, Soft; 


   Negative: BS Hyperactive, BS Hypoactive, Tenderness, Hepatospenomegaly, Mass,

Hernia


Male  Exam:  Positive: Normal Genital Exam


Extremity Exam:  Positive: Normal pulses (patient with nonpalpable popliteal 

dorsalis pedis and posterior tibial pulses in the right lower extremity.  The 

pulses in the right lower extremity are obtainable with continuous wave Doppler 

ultrasound and are monophasic.); 


   Negative: Clubbing, Cyanosis, Edema, Tenderness, Swelling


Skin Exam:  Positive: Nl turgor and temperature; 


   Negative: Rash, Breakdown, Lesion, Pruritus


Neuro Exam:  Positive: Normal Gait, Normal Speech, Strength at 5/5 X4 ext, 

Normal Tone, Sensation Intact, Cranial Nerves 3-12 NL, Reflexes 2+


Psych Exam:  Positive: Mental status NL, Mood NL, Memory Intact, Oriented x 3; 


   Negative: Anxiety





Assessment/Plan


Assessment


Patient is stable and has been cleared by physical therapy for discharge and 

will be discharged in the morning.  Patient staples were removed.  Patient has 

adequate pain control with oral narcotics.  Patient has been started on Plavix 

and cilostazol.  Patient was again counseled on the need to not smoke once he is

discharged.





Plan


Discharge in [place].











Marcus Howard MD              Feb 22, 2019 16:23

## 2019-02-23 VITALS — SYSTOLIC BLOOD PRESSURE: 133 MMHG | DIASTOLIC BLOOD PRESSURE: 77 MMHG

## 2019-02-23 LAB
BUN SERPL-MCNC: 11 MG/DL (ref 7–18)
CALCIUM SERPL-MCNC: 8.9 MG/DL (ref 8.5–10.1)
CHLORIDE SERPL-SCNC: 101 MEQ/L (ref 98–107)
CHOLEST SERPL-MCNC: 125 MG/DL (ref ?–200)
CHOLEST/HDLC SERPL: 3.57 {RATIO} (ref ?–5)
CO2 SERPL-SCNC: 26 MEQ/L (ref 21–32)
CREAT SERPL-MCNC: 0.92 MG/DL (ref 0.7–1.3)
GFR SERPL CREATININE-BSD FRML MDRD: > 60 ML/MIN/{1.73_M2} (ref 60–?)
GLUCOSE SERPL-MCNC: 96 MG/DL (ref 70–100)
HCT VFR BLD AUTO: 28.1 % (ref 42–52)
HDLC SERPL-MCNC: 35 MG/DL (ref 40–?)
HGB BLD-MCNC: 9.3 G/DL (ref 13.5–17.5)
LDLC SERPL CALC-MCNC: 74 MG/DL (ref ?–100)
MCH RBC QN AUTO: 29.1 PG (ref 27–33)
MCHC RBC AUTO-ENTMCNC: 33.1 G/DL (ref 32–36.5)
MCV RBC AUTO: 87.8 FL (ref 80–96)
NONHDLC SERPL-MCNC: 90 MG/DL
PLATELET # BLD AUTO: 276 10^3/UL (ref 150–450)
POTASSIUM SERPL-SCNC: 3.9 MEQ/L (ref 3.5–5.1)
RBC # BLD AUTO: 3.2 10^6/UL (ref 4.3–6.1)
SODIUM SERPL-SCNC: 137 MEQ/L (ref 136–145)
TRIGL SERPL-MCNC: 81 MG/DL (ref ?–150)
WBC # BLD AUTO: 10.2 10^3/UL (ref 4–10)

## 2019-02-23 RX ADMIN — CILOSTAZOL SCH MG: 100 TABLET ORAL at 10:21

## 2019-02-23 RX ADMIN — DOCUSATE SODIUM SCH MG: 100 CAPSULE, LIQUID FILLED ORAL at 09:00

## 2019-02-23 RX ADMIN — DOCUSATE SODIUM,SENNOSIDES SCH TAB: 50; 8.6 TABLET, FILM COATED ORAL at 09:00

## 2019-02-23 RX ADMIN — GABAPENTIN SCH MG: 100 CAPSULE ORAL at 08:15

## 2019-02-23 NOTE — DS.PDOC
Discharge Summary


General


Date of Admission


Feb 7, 2019 at 18:48


Date of Discharge


02/23/2019


Attending Physician:  Marcus Howard MD





Discharge Summary


PROCEDURES PERFORMED DURING STAY: Right lower extremity angiogram with attempted

recanalization.  Attempted right lower extremity femoral tibial bypass.





ADMITTING DIAGNOSES: 


1.  Right lower extremity claudication with right superficial femoral and 

popliteal artery occlusion.





DISCHARGE DIAGNOSES:


1.  Right lower extremity claudication with right superficial femoral and 

popliteal artery occlusion.





COMPLICATIONS/CHIEF COMPLAINT: Claudication.





HISTORY OF PRESENT ILLNESS: Patient is a 23-year-old male with occlusion of his 

right superficial femoral and popliteal arteries who underwent multiple attempts

at revascularization endovascularly without success.





HOSPITAL COURSE: Patient was admitted and underwent a another attempt at 

endovascular treatment of his right lower extremity claudication without 

success.  Patient was then subsequently taken to the operating room to undergo a

right femoral to tibial artery bypass graft with reverse saphenous vein graft 

from the right lower extremity due to the severe inflammatory process and 

disease process in the right lower extremity the tibial vessels were not able to

be dissected free and used for target vessels for distal bypass.  The procedure 

was aborted while the patient still had adequate flow to the right lower 

extremity.  It was discussed with the patient postoperatively that the disease 

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

perficial femoral artery previously.  The patient has claudication and the 

discussion was that he will stop smoking and be treated conservatively.  Patient

underwent aggressive physical therapy during his hospitalization with prolonged 

treatment for severe postoperative pain.. 





DISCHARGE MEDICATIONS: Please see below.


 


ALLERGIES: Please see below.





PHYSICAL EXAMINATION ON DISCHARGE:


VITAL SIGNS: Please see below.


GENERAL: No apparent distress


HEENT: Normal


NECK: Supple with no carotid bruits


CARDIOVASCULAR EXAMINATION: Regular rate and rhythm


RESPIRATORY EXAMINATION: Clear to auscultation


ABDOMINAL EXAMINATION: Soft nontender nondistended


EXTREMITIES: Bilateral lower extremities were warm and well perfused.  Right 

lower extremity incisions are clean and well-healed.


SKIN: Warm and well perfused


NEUROLOGICAL EXAMINATION: No focal deficits


PSYCHIATRIC EXAMINATION: Normal





LABORATORY DATA: Please see below.





IMAGING: None





PROGNOSIS: Good





ACTIVITY: As tolerated.





DIET: Regular





DISCHARGE PLAN: Home





DISPOSITION: 01 Home, Self-Care.





DISCHARGE INSTRUCTIONS:


1.  Follow-up postoperatively in the office in 7-10 days.





ITEMS TO FOLLOWUP ON ON OUTPATIENT:


1.  Tobacco cessation.





DISCHARGE CONDITION: Stable.





TIME SPENT ON DISCHARGE: Greater than 45 minutes.





Vital Signs/I&Os


Laboratory Tests


2/23/19 06:04








Red Blood Count 3.20 L, Mean Corpuscular Volume 87.8, Mean Corpuscular 

Hemoglobin 29.1, Mean Corpuscular Hemoglobin Concent 33.1, Red Cell Distribution

Width 12.1








Vital Signs








  Date Time  Temp Pulse Resp B/P (MAP) Pulse Ox O2 Delivery O2 Flow Rate FiO2


 


2/23/19 08:46   18     


 


2/23/19 08:16   16     


 


2/23/19 06:00 97.5 93 18 133/77 (95) 98   


 


2/23/19 03:41   18     


 


2/22/19 22:12   18     


 


2/22/19 22:00 98.6 122 16 132/72 (92) 100   


 


2/22/19 17:28   18     


 


2/22/19 14:00 98.0 85 18 141/74 (96) 100   


 


2/22/19 13:43   18     














Intake & Output 


 


 2/23/19





 06:00


 


Intake Total 1030 ml


 


Output Total 0 ml


 


Balance 1030 ml





Laboratory Tests


2/23/19 06:04: 


White Blood Count 10.2H, Red Blood Count 3.20L, Hemoglobin 9.3L, Hematocrit 

28.1L, Mean Corpuscular Volume 87.8, Mean Corpuscular Hemoglobin 29.1, Mean 

Corpuscular Hemoglobin Concent 33.1, Red Cell Distribution Width 12.1, Platelet 

Count 276, Nucleated Red Blood Cells % (auto) 0.0, Sodium Level 137, Potassium 

Level 3.9, Chloride Level 101, Carbon Dioxide Level 26, Anion Gap 10, Blood Urea

Nitrogen 11, Creatinine 0.92, Glomerular Filtration Rate > 60.0, Fasting Glucose

96, Calcium Level 8.9, Triglycerides Level 81, LDL Cholesterol 74, Total 

Cholesterol 125, Non-HDL Cholesterol (LDL + VLDL) 90, Total HDL Cholesterol 35L,

Cholesterol/HDL Ratio 3.571





Microbiology


2/18/19 Stool Occult Blood (SHAUN) - Final, Complete


          





Vital Signs








  Date Time  Temp Pulse Resp B/P (MAP) Pulse Ox O2 Delivery O2 Flow Rate FiO2


 


2/23/19 08:46   18     


 


2/23/19 06:00 97.5 93  133/77 (95) 98   














I&O- Last 24 Hours up to 6 AM 


 


 2/23/19





 06:00


 


Intake Total 1030 ml


 


Output Total 0 ml


 


Balance 1030 ml








Vital Signs








  Date Time  Temp Pulse Resp B/P (MAP) Pulse Ox O2 Delivery O2 Flow Rate FiO2


 


2/23/19 08:46   18     


 


2/23/19 08:16   16     


 


2/23/19 06:00 97.5 93 18 133/77 (95) 98   


 


2/23/19 03:41   18     


 


2/22/19 22:12   18     


 


2/22/19 22:00 98.6 122 16 132/72 (92) 100   


 


2/22/19 17:28   18     


 


2/22/19 14:00 98.0 85 18 141/74 (96) 100   


 


2/22/19 13:43   18     














Intake & Output 


 


 2/23/19





 06:00


 


Intake Total 1030 ml


 


Output Total 0 ml


 


Balance 1030 ml





Laboratory Tests


2/23/19 06:04: 


White Blood Count 10.2H, Red Blood Count 3.20L, Hemoglobin 9.3L, Hematocrit 

28.1L, Mean Corpuscular Volume 87.8, Mean Corpuscular Hemoglobin 29.1, Mean 

Corpuscular Hemoglobin Concent 33.1, Red Cell Distribution Width 12.1, Platelet 

Count 276, Nucleated Red Blood Cells % (auto) 0.0, Sodium Level 137, Potassium 

Level 3.9, Chloride Level 101, Carbon Dioxide Level 26, Anion Gap 10, Blood Urea

Nitrogen 11, Creatinine 0.92, Glomerular Filtration Rate > 60.0, Fasting Glucose

96, Calcium Level 8.9, Triglycerides Level 81, LDL Cholesterol 74, Total 

Cholesterol 125, Non-HDL Cholesterol (LDL + VLDL) 90, Total HDL Cholesterol 35L,

Cholesterol/HDL Ratio 3.571





Microbiology


2/18/19 Stool Occult Blood (SHAUN) - Final, Complete





Laboratory Data


Labs 24H


Laboratory Tests 2


2/23/19 06:04: 


Nucleated Red Blood Cells % (auto) 0.0, Anion Gap 10, Glomerular Filtration Rate

> 60.0, Calcium Level 8.9, Triglycerides Level 81, LDL Cholesterol 74, Total 

Cholesterol 125, Non-HDL Cholesterol (LDL + VLDL) 90, Total HDL Cholesterol 35L,

Cholesterol/HDL Ratio 3.571


CBC/BMP


Laboratory Tests


2/23/19 06:04








Red Blood Count 3.20 L, Mean Corpuscular Volume 87.8, Mean Corpuscular 

Hemoglobin 29.1, Mean Corpuscular Hemoglobin Concent 33.1, Red Cell Distribution

Width 12.1





Microbiology





Microbiology


2/18/19 Stool Occult Blood (SHAUN) - Final, Complete





Discharge Medications


Scheduled


Cilostazol (Cilostazol) 100 Mg Tab, 100 MG PO BID@0730,1730


Clopidogrel Bisulfate (Clopidogrel) 75 Mg Tab, 75 MG PO DAILY


Ferrous Gluconate (Ferrous Gluconate) 324 Mg Tab, 1 TAB PO DAILY for iron


Gabapentin (Gabapentin) 100 Mg Cap, 100 MG PO TID





Scheduled PRN


Oxycodone/Acetaminophen (Percocet 5MG/325MG Tablet) 1 Tab Tab, 2 TAB PO Q6HP PRN

for SEVERE PAIN (PS 8-10)





Allergies


Coded Allergies:  


     No Known Allergies (Unverified , 1/29/19)











Marcus Howard MD              Feb 23, 2019 12:14

## 2019-02-27 NOTE — RO
DATE OF PROCEDURE:  02/08/2019

 

ATTENDING SURGEON: Dr. JUAN Howard

 

ASSISTANT: None.

 

PREOPERATIVE DIAGNOSES: Right lower extremity claudication with tobacco use and

occlusion of the right superficial femoral and popliteal arteries.

 

POSTOPERATIVE DIAGNOSES: Right lower extremity claudication with tobacco use and

occlusion of the right superficial femoral and popliteal arteries.

 

PROCEDURE: Right greater saphenous vein harvest, right common femoral arterial

exposure, right posterior tibial artery exposure, right dorsalis pedis artery

exposure, right anterior tibial artery exposure.

 

INDICATION:

The patient is a 23-year-old male with his tobacco history and injury to his

right leg with complete occlusion of his right superficial femoral and popliteal

arteries with right lower extremity claudication.  The patient will undergo

attempted right lower extremity bypass grafting with reverse saphenous vein

graft.  Risks, benefits, and alternative treatment options were discussed with

the patient.

 

ANESTHESIA: General endotracheal.

 

ESTIMATED BLOOD LOSS: 1500 mL

 

IV FLUIDS: 5000 units, 1 unit of packed red blood cells

 

URINE OUTPUT: 200 mL

 

HEPARIN: None.

 

COMPLICATIONS: None.

 

DRAINS: None.

 

IMPLANTS: None.

 

DESCRIPTION OF PROCEDURE

The patient was taken to the operating room, placed supine on the operating room

table and the right lower extremity was prepped and draped in a standard surgical

fashion.  The greater saphenous vein was harvested from the ankle to the

midthigh.  The common femoral artery was exposed through an oblique incision and

encircled with Vesseloops. The posterior tibial artery was identified in the

below-knee region and was noted to be severely adherent to the posterior tibial

vessels and severely inflamed. The posterior tibial artery was then exposed at

the ankle and noted to be similar with severe thickening and adherent to the

surrounding tissue and posterior tibial veins.  The anterior tibial artery was

then exposed and this also was severely adherent and inflamed as well as the

dorsalis pedis artery.  Due to inability to completely dissect any artery free

due to the severe inflammatory response and concerns for worsening ischemia of

the right foot rather than improvement, the procedure was aborted.  All incisions

were closed using #2-0 Vicryl to approximate the deeper layers and staples to

approximate the skin. The right lower extremity was well perfused at the

completion of the attempted bypass.  The patient was transferred to the recovery

room awake, alert, extubated and in stable condition.

## 2019-02-27 NOTE — REPIR
DATE OF PROCEDURE:  02/07/2019

 

ATTENDING SURGEON:  Dr. JUAN Howard

 

ASSISTANTS:  Mi Rodriguez and Eliza Castellanos.

 

PREOPERATIVE DIAGNOSES:  Right lower extremity claudication, right superficial

femoral and popliteal artery occlusion, tobacco use.

 

POSTOPERATIVE DIAGNOSES:  Right lower extremity claudication, right superficial

femoral and popliteal artery occlusion, tobacco use.

 

PROCEDURE:  Aortogram, iliofemoral angiogram, selective right common femoral

artery catheter placement, selective right profunda femoris artery catheter

placement, ultrasound-guided right posterior tibial artery cannulation, Mynx

closure of the left common femoral arteriotomy.

 

INDICATION:  The patient is a 23-year-old male with complete occlusion of his

right superficial femoral and popliteal arteries who will undergo attempted

recanalization prior to undergoing bypass grafting.  Risks, benefits, alternative

treatment options were discussed with the patient.

 

ANESTHESIA:  Local sedation with 6 mg of Versed, 300 mcg of fentanyl and 20 mL of

2% lidocaine.

 

HEPARIN:  8000 units.

 

SEDATION TIME:  5:02 p.m. to 6:47 p.m.

 

FLUOROSCOPY TIME:  14.3 minutes.

 

CONTRAST:  27.5 mL

 

COMPLICATIONS:  None.

 

DRAINS:  None.

 

SPECIMENS:  None

 

IMPLANT:  Left femoral arterial Mynx closure device.

 

DESCRIPTION OF PROCEDURE:  The patient was taken to the angiography suite, placed

supine on the angiography room table and then prepped and draped in a standard

surgical fashion.

 

The left common femoral artery was cannulated.  A catheter placed in the aorta

and over the bifurcation and in the right common femoral and profunda femoris

arteries.  The right posterior tibial artery was cannulated using ultrasound

guidance and multiple attempts were made to traverse through the occluded

popliteal and superficial femoral artery with reentry into the common femoral

artery without success.  Catheters and wires were removed, and Mynx closure was

used close the arteriotomy in the left common femoral artery with an additional

10 minutes of adjunctive pressure applied for hemostasis.  The posterior tibial

sheath was removed with manual compression for hemostasis.  Dressings were then

applied.

 

The patient tolerated the procedure well.  All instrument, sponge, and needle

counts were correct at the end of the case.  There were no complications.  Dr. Howard was present for and directed the entire case.  The patient was transferred

to the recovery room and subsequently to the floor in stable condition.

## 2019-06-07 ENCOUNTER — HOSPITAL ENCOUNTER (OUTPATIENT)
Dept: HOSPITAL 53 - M RAD | Age: 24
End: 2019-06-07
Attending: FAMILY MEDICINE
Payer: COMMERCIAL

## 2019-06-07 DIAGNOSIS — R60.0: Primary | ICD-10-CM

## 2019-06-07 NOTE — REP
DEEP VENOUS ULTRASONOGRAPHY RIGHT THIGH, RULE OUT DVT:

 

REASON FOR EXAM:  Pain and swelling.

 

TECHNIQUE:

 

Multiple ultrasonographic images of the deep venous structures of the right thigh

were obtained from the common femoral vein to the popliteal vein along with

Doppler interrogation and color flow Doppler images.

 

FINDINGS:

 

There is no abnormal echogenic material seen within any of the visualized deep

venous structures that would suggest acute thrombosis.  Coaptation is

unremarkable throughout.  Doppler interrogation shows an expected response to

respiratory variability and augmentation.  The color flow images show what

appears to be a normal vascular pattern throughout.

 

IMPRESSION:

 

There is no ultrasonographic evidence of deep venous thrombosis involving any of

the visualized deep venous structures of the right thigh, as described above.

 

 

Electronically Signed by

Liborio Clarke DO 06/07/2019 04:45 P

## 2019-09-06 ENCOUNTER — HOSPITAL ENCOUNTER (OUTPATIENT)
Dept: HOSPITAL 53 - M RAD | Age: 24
End: 2019-09-06
Attending: PHYSICIAN ASSISTANT
Payer: COMMERCIAL

## 2019-09-06 DIAGNOSIS — I70.202: ICD-10-CM

## 2019-09-06 DIAGNOSIS — Z48.812: Primary | ICD-10-CM

## 2019-09-06 NOTE — REP
BILATERAL LOWER EXTREMITY DUPLEX DOPPLER ARTERIAL ULTRASOUND:

 

Real-time ultrasound evaluation and duplex Doppler interrogation of bilateral

lower extremity arterial systems is performed.  DANIELLE on the left is 0.9 but could

not be calculated on the right.  There is severe stenosis with trickle flow seen

in the proximal right common femoral artery.  The distal right common femoral

artery is reconstituted via the pelvic collateral artery.  There is minimal flow

in the proximal right superficial femoral artery to the mid aspect about 12 cm

distal to its origin.  The distal superficial femoral artery appears occluded as

does the right popliteal and proximal anterior tibial artery.  Multiple

collateral arteries are seen in the region of the knee and popliteal fossa.  The

anterior and posterior tibial arteries could not be identified.  Distal anterior

tibial artery is seen which demonstrates slow flow.  There are mild phasic

waveforms of the right common femoral, proximal superficial femoral and distal

anterior tibial arteries.

 

On the left, there is no plaquing or narrowing and certainly no evidence of

hemodynamically significant stenosis with diffuse triphasic waveforms noted.

 

                                           Right Peak                       Left

Peak

 

                                     Systolic Velocity              Systolic

velocity

 

Common femoral artery      102 cm/s                         105 cm/s

 

Profunda                               35 cm/s                           62 cm/s

 

Proximal SFA                        40 cm/s                           69 cm/s

 

Mid SFA                                37 cm/s                           78 cm/s

 

Distal SFA                           occluded                           43 cm/s

 

Popliteal                              occluded                           41

cm/s

 

Proximal PINKY                      occluded                            18 cm/s

 

Tibial peroneal trunk           collaterals                          46 cm/s

 

Proximal PTA                      collaterals                          28 cm/s

 

Distal PTA                            not seen                            32

cm/s

 

Distal PINKY                            12 cm/s                              57

cm/s

 

IMPRESSION:

 

High grade stenosis right common femoral artery proximally with reconstitution of

the distal common femoral artery via a pelvic collateral artery.  Significant

narrowing of proximal and mid right SFA with occlusion 12 cm distal to its

origin.  Collateral vessels traverse distally.  Only the native distal right

anterior tibial artery is visualized in the lower leg with slow flow.

 

No evidence of significant stenosis left lower extremity arterial system.

 

 

Electronically Signed by

Heber Coffman MD 09/06/2019 04:37 P